# Patient Record
Sex: MALE | Race: WHITE | NOT HISPANIC OR LATINO | Employment: UNEMPLOYED | URBAN - METROPOLITAN AREA
[De-identification: names, ages, dates, MRNs, and addresses within clinical notes are randomized per-mention and may not be internally consistent; named-entity substitution may affect disease eponyms.]

---

## 2024-09-11 ENCOUNTER — HOSPITAL ENCOUNTER (INPATIENT)
Facility: CLINIC | Age: 59
LOS: 2 days | Discharge: HOME | DRG: 271 | End: 2024-09-13
Attending: EMERGENCY MEDICINE | Admitting: INTERNAL MEDICINE
Payer: COMMERCIAL

## 2024-09-11 ENCOUNTER — APPOINTMENT (OUTPATIENT)
Dept: ULTRASOUND IMAGING | Facility: CLINIC | Age: 59
End: 2024-09-11
Attending: EMERGENCY MEDICINE
Payer: COMMERCIAL

## 2024-09-11 ENCOUNTER — HOSPITAL ENCOUNTER (EMERGENCY)
Facility: CLINIC | Age: 59
Discharge: ANOTHER HOSPITAL | End: 2024-09-11
Attending: EMERGENCY MEDICINE | Admitting: EMERGENCY MEDICINE
Payer: COMMERCIAL

## 2024-09-11 VITALS
HEART RATE: 61 BPM | HEIGHT: 72 IN | RESPIRATION RATE: 18 BRPM | WEIGHT: 211.42 LBS | BODY MASS INDEX: 28.64 KG/M2 | DIASTOLIC BLOOD PRESSURE: 89 MMHG | SYSTOLIC BLOOD PRESSURE: 127 MMHG | OXYGEN SATURATION: 98 % | TEMPERATURE: 97.8 F

## 2024-09-11 DIAGNOSIS — I82.412 ACUTE DEEP VEIN THROMBOSIS (DVT) OF FEMORAL VEIN OF LEFT LOWER EXTREMITY (H): ICD-10-CM

## 2024-09-11 DIAGNOSIS — I80.202 PHLEGMASIA CERULEA DOLENS OF LEFT LOWER EXTREMITY (H): Primary | ICD-10-CM

## 2024-09-11 DIAGNOSIS — Z96.642 HISTORY OF LEFT HIP REPLACEMENT: ICD-10-CM

## 2024-09-11 DIAGNOSIS — I82.422 ACUTE DEEP VEIN THROMBOSIS (DVT) OF ILIAC VEIN OF LEFT LOWER EXTREMITY (H): ICD-10-CM

## 2024-09-11 LAB
ANION GAP SERPL CALCULATED.3IONS-SCNC: 10 MMOL/L (ref 7–15)
BASOPHILS # BLD AUTO: 0.1 10E3/UL (ref 0–0.2)
BASOPHILS NFR BLD AUTO: 1 %
BUN SERPL-MCNC: 16.1 MG/DL (ref 6–20)
CALCIUM SERPL-MCNC: 10.8 MG/DL (ref 8.8–10.4)
CHLORIDE SERPL-SCNC: 103 MMOL/L (ref 98–107)
CREAT SERPL-MCNC: 1.23 MG/DL (ref 0.67–1.17)
EGFRCR SERPLBLD CKD-EPI 2021: 69 ML/MIN/1.73M2
EOSINOPHIL # BLD AUTO: 0.2 10E3/UL (ref 0–0.7)
EOSINOPHIL NFR BLD AUTO: 3 %
ERYTHROCYTE [DISTWIDTH] IN BLOOD BY AUTOMATED COUNT: 13.2 % (ref 10–15)
GLUCOSE SERPL-MCNC: 90 MG/DL (ref 70–99)
HCO3 SERPL-SCNC: 25 MMOL/L (ref 22–29)
HCT VFR BLD AUTO: 44.5 % (ref 40–53)
HGB BLD-MCNC: 14.6 G/DL (ref 13.3–17.7)
HOLD SPECIMEN: NORMAL
HOLD SPECIMEN: NORMAL
IMM GRANULOCYTES # BLD: 0 10E3/UL
IMM GRANULOCYTES NFR BLD: 0 %
LYMPHOCYTES # BLD AUTO: 1.1 10E3/UL (ref 0.8–5.3)
LYMPHOCYTES NFR BLD AUTO: 15 %
MCH RBC QN AUTO: 32.4 PG (ref 26.5–33)
MCHC RBC AUTO-ENTMCNC: 32.8 G/DL (ref 31.5–36.5)
MCV RBC AUTO: 99 FL (ref 78–100)
MONOCYTES # BLD AUTO: 0.6 10E3/UL (ref 0–1.3)
MONOCYTES NFR BLD AUTO: 9 %
NEUTROPHILS # BLD AUTO: 5.3 10E3/UL (ref 1.6–8.3)
NEUTROPHILS NFR BLD AUTO: 73 %
NRBC # BLD AUTO: 0 10E3/UL
NRBC BLD AUTO-RTO: 0 /100
PLATELET # BLD AUTO: 173 10E3/UL (ref 150–450)
POTASSIUM SERPL-SCNC: 3.9 MMOL/L (ref 3.4–5.3)
RBC # BLD AUTO: 4.5 10E6/UL (ref 4.4–5.9)
SODIUM SERPL-SCNC: 138 MMOL/L (ref 135–145)
WBC # BLD AUTO: 7.3 10E3/UL (ref 4–11)

## 2024-09-11 PROCEDURE — 96376 TX/PRO/DX INJ SAME DRUG ADON: CPT

## 2024-09-11 PROCEDURE — 80048 BASIC METABOLIC PNL TOTAL CA: CPT | Performed by: EMERGENCY MEDICINE

## 2024-09-11 PROCEDURE — 96366 THER/PROPH/DIAG IV INF ADDON: CPT

## 2024-09-11 PROCEDURE — 99285 EMERGENCY DEPT VISIT HI MDM: CPT | Mod: 25

## 2024-09-11 PROCEDURE — 93005 ELECTROCARDIOGRAM TRACING: CPT

## 2024-09-11 PROCEDURE — 99223 1ST HOSP IP/OBS HIGH 75: CPT | Performed by: INTERNAL MEDICINE

## 2024-09-11 PROCEDURE — 96365 THER/PROPH/DIAG IV INF INIT: CPT

## 2024-09-11 PROCEDURE — 250N000011 HC RX IP 250 OP 636: Performed by: EMERGENCY MEDICINE

## 2024-09-11 PROCEDURE — 36415 COLL VENOUS BLD VENIPUNCTURE: CPT | Performed by: EMERGENCY MEDICINE

## 2024-09-11 PROCEDURE — 120N000001 HC R&B MED SURG/OB

## 2024-09-11 PROCEDURE — 93971 EXTREMITY STUDY: CPT | Mod: LT

## 2024-09-11 PROCEDURE — 85025 COMPLETE CBC W/AUTO DIFF WBC: CPT | Performed by: EMERGENCY MEDICINE

## 2024-09-11 RX ORDER — PROCHLORPERAZINE MALEATE 10 MG
10 TABLET ORAL EVERY 6 HOURS PRN
Status: DISCONTINUED | OUTPATIENT
Start: 2024-09-11 | End: 2024-09-13 | Stop reason: HOSPADM

## 2024-09-11 RX ORDER — NALOXONE HYDROCHLORIDE 0.4 MG/ML
0.4 INJECTION, SOLUTION INTRAMUSCULAR; INTRAVENOUS; SUBCUTANEOUS
Status: DISCONTINUED | OUTPATIENT
Start: 2024-09-11 | End: 2024-09-13 | Stop reason: HOSPADM

## 2024-09-11 RX ORDER — ONDANSETRON 4 MG/1
4 TABLET, ORALLY DISINTEGRATING ORAL EVERY 6 HOURS PRN
Status: DISCONTINUED | OUTPATIENT
Start: 2024-09-11 | End: 2024-09-13 | Stop reason: HOSPADM

## 2024-09-11 RX ORDER — HYDROMORPHONE HCL IN WATER/PF 6 MG/30 ML
0.2 PATIENT CONTROLLED ANALGESIA SYRINGE INTRAVENOUS
Status: DISCONTINUED | OUTPATIENT
Start: 2024-09-11 | End: 2024-09-13 | Stop reason: HOSPADM

## 2024-09-11 RX ORDER — POLYETHYLENE GLYCOL 3350 17 G/17G
17 POWDER, FOR SOLUTION ORAL 2 TIMES DAILY PRN
Status: DISCONTINUED | OUTPATIENT
Start: 2024-09-11 | End: 2024-09-13 | Stop reason: HOSPADM

## 2024-09-11 RX ORDER — BISACODYL 10 MG
10 SUPPOSITORY, RECTAL RECTAL DAILY PRN
Status: DISCONTINUED | OUTPATIENT
Start: 2024-09-11 | End: 2024-09-13 | Stop reason: HOSPADM

## 2024-09-11 RX ORDER — CALCIUM CARBONATE 500 MG/1
1000 TABLET, CHEWABLE ORAL 4 TIMES DAILY PRN
Status: DISCONTINUED | OUTPATIENT
Start: 2024-09-11 | End: 2024-09-13 | Stop reason: HOSPADM

## 2024-09-11 RX ORDER — AMOXICILLIN 250 MG
1 CAPSULE ORAL 2 TIMES DAILY PRN
Status: DISCONTINUED | OUTPATIENT
Start: 2024-09-11 | End: 2024-09-13 | Stop reason: HOSPADM

## 2024-09-11 RX ORDER — DEXTROSE, SODIUM CHLORIDE, SODIUM LACTATE, POTASSIUM CHLORIDE, AND CALCIUM CHLORIDE 5; .6; .31; .03; .02 G/100ML; G/100ML; G/100ML; G/100ML; G/100ML
INJECTION, SOLUTION INTRAVENOUS CONTINUOUS
Status: DISCONTINUED | OUTPATIENT
Start: 2024-09-12 | End: 2024-09-13

## 2024-09-11 RX ORDER — PROCHLORPERAZINE 25 MG
25 SUPPOSITORY, RECTAL RECTAL EVERY 12 HOURS PRN
Status: DISCONTINUED | OUTPATIENT
Start: 2024-09-11 | End: 2024-09-13 | Stop reason: HOSPADM

## 2024-09-11 RX ORDER — ASPIRIN 325 MG
325 TABLET, DELAYED RELEASE (ENTERIC COATED) ORAL DAILY
Status: ON HOLD | COMMUNITY
End: 2024-09-13

## 2024-09-11 RX ORDER — HYDRALAZINE HYDROCHLORIDE 10 MG/1
10 TABLET, FILM COATED ORAL EVERY 4 HOURS PRN
Status: DISCONTINUED | OUTPATIENT
Start: 2024-09-11 | End: 2024-09-13 | Stop reason: HOSPADM

## 2024-09-11 RX ORDER — HEPARIN SODIUM 10000 [USP'U]/100ML
0-5000 INJECTION, SOLUTION INTRAVENOUS CONTINUOUS
Status: DISPENSED | OUTPATIENT
Start: 2024-09-12 | End: 2024-09-13

## 2024-09-11 RX ORDER — ACETAMINOPHEN 650 MG/1
650 SUPPOSITORY RECTAL EVERY 4 HOURS PRN
Status: DISCONTINUED | OUTPATIENT
Start: 2024-09-11 | End: 2024-09-13 | Stop reason: HOSPADM

## 2024-09-11 RX ORDER — NALOXONE HYDROCHLORIDE 0.4 MG/ML
0.2 INJECTION, SOLUTION INTRAMUSCULAR; INTRAVENOUS; SUBCUTANEOUS
Status: DISCONTINUED | OUTPATIENT
Start: 2024-09-11 | End: 2024-09-13 | Stop reason: HOSPADM

## 2024-09-11 RX ORDER — SALIVA STIMULANT COMB. NO.3
2 SPRAY, NON-AEROSOL (ML) MUCOUS MEMBRANE
Status: DISCONTINUED | OUTPATIENT
Start: 2024-09-11 | End: 2024-09-13 | Stop reason: HOSPADM

## 2024-09-11 RX ORDER — HEPARIN SODIUM 10000 [USP'U]/100ML
0-5000 INJECTION, SOLUTION INTRAVENOUS CONTINUOUS
Status: DISCONTINUED | OUTPATIENT
Start: 2024-09-11 | End: 2024-09-11 | Stop reason: HOSPADM

## 2024-09-11 RX ORDER — OXYCODONE HYDROCHLORIDE 5 MG/1
5 TABLET ORAL EVERY 4 HOURS PRN
Status: DISCONTINUED | OUTPATIENT
Start: 2024-09-11 | End: 2024-09-13 | Stop reason: HOSPADM

## 2024-09-11 RX ORDER — HYDROMORPHONE HCL IN WATER/PF 6 MG/30 ML
0.4 PATIENT CONTROLLED ANALGESIA SYRINGE INTRAVENOUS
Status: DISCONTINUED | OUTPATIENT
Start: 2024-09-11 | End: 2024-09-13 | Stop reason: HOSPADM

## 2024-09-11 RX ORDER — ONDANSETRON 2 MG/ML
4 INJECTION INTRAMUSCULAR; INTRAVENOUS EVERY 6 HOURS PRN
Status: DISCONTINUED | OUTPATIENT
Start: 2024-09-11 | End: 2024-09-13 | Stop reason: HOSPADM

## 2024-09-11 RX ORDER — AMOXICILLIN 250 MG
2 CAPSULE ORAL 2 TIMES DAILY PRN
Status: DISCONTINUED | OUTPATIENT
Start: 2024-09-11 | End: 2024-09-13 | Stop reason: HOSPADM

## 2024-09-11 RX ORDER — HEPARIN SODIUM 10000 [USP'U]/100ML
0-5000 INJECTION, SOLUTION INTRAVENOUS CONTINUOUS
Status: DISCONTINUED | OUTPATIENT
Start: 2024-09-11 | End: 2024-09-11

## 2024-09-11 RX ORDER — HYDRALAZINE HYDROCHLORIDE 20 MG/ML
10 INJECTION INTRAMUSCULAR; INTRAVENOUS EVERY 4 HOURS PRN
Status: DISCONTINUED | OUTPATIENT
Start: 2024-09-11 | End: 2024-09-13 | Stop reason: HOSPADM

## 2024-09-11 RX ORDER — LIDOCAINE 40 MG/G
CREAM TOPICAL
Status: DISCONTINUED | OUTPATIENT
Start: 2024-09-11 | End: 2024-09-13 | Stop reason: HOSPADM

## 2024-09-11 RX ORDER — ACETAMINOPHEN 325 MG/1
650 TABLET ORAL EVERY 4 HOURS PRN
Status: DISCONTINUED | OUTPATIENT
Start: 2024-09-11 | End: 2024-09-13 | Stop reason: HOSPADM

## 2024-09-11 RX ADMIN — HEPARIN SODIUM 1700 UNITS/HR: 10000 INJECTION, SOLUTION INTRAVENOUS at 21:25

## 2024-09-11 RX ADMIN — HEPARIN SODIUM 1750 UNITS/HR: 10000 INJECTION, SOLUTION INTRAVENOUS at 18:06

## 2024-09-11 ASSESSMENT — ACTIVITIES OF DAILY LIVING (ADL)
ADLS_ACUITY_SCORE: 35
ADLS_ACUITY_SCORE: 35
DIFFICULTY_EATING/SWALLOWING: NO
TOILETING_ISSUES: NO
ADLS_ACUITY_SCORE: 35
DIFFICULTY_COMMUNICATING: NO
HEARING_DIFFICULTY_OR_DEAF: NO
ADLS_ACUITY_SCORE: 35
FALL_HISTORY_WITHIN_LAST_SIX_MONTHS: NO
WEAR_GLASSES_OR_BLIND: YES
ADLS_ACUITY_SCORE: 35
ADLS_ACUITY_SCORE: 33
VISION_MANAGEMENT: CONTACT LENSES
DOING_ERRANDS_INDEPENDENTLY_DIFFICULTY: NO
CONCENTRATING,_REMEMBERING_OR_MAKING_DECISIONS_DIFFICULTY: NO
WALKING_OR_CLIMBING_STAIRS_DIFFICULTY: NO
DRESSING/BATHING_DIFFICULTY: NO
ADLS_ACUITY_SCORE: 35
CHANGE_IN_FUNCTIONAL_STATUS_SINCE_ONSET_OF_CURRENT_ILLNESS/INJURY: NO

## 2024-09-11 ASSESSMENT — COLUMBIA-SUICIDE SEVERITY RATING SCALE - C-SSRS
1. IN THE PAST MONTH, HAVE YOU WISHED YOU WERE DEAD OR WISHED YOU COULD GO TO SLEEP AND NOT WAKE UP?: NO
2. HAVE YOU ACTUALLY HAD ANY THOUGHTS OF KILLING YOURSELF IN THE PAST MONTH?: NO
2. HAVE YOU ACTUALLY HAD ANY THOUGHTS OF KILLING YOURSELF IN THE PAST MONTH?: NO
1. IN THE PAST MONTH, HAVE YOU WISHED YOU WERE DEAD OR WISHED YOU COULD GO TO SLEEP AND NOT WAKE UP?: NO
6. HAVE YOU EVER DONE ANYTHING, STARTED TO DO ANYTHING, OR PREPARED TO DO ANYTHING TO END YOUR LIFE?: NO
6. HAVE YOU EVER DONE ANYTHING, STARTED TO DO ANYTHING, OR PREPARED TO DO ANYTHING TO END YOUR LIFE?: NO

## 2024-09-11 NOTE — ED TRIAGE NOTES
Pt had a total hip replacement on the left 3 months ago. He states he has been having swelling in that leg and had an ultrasound done today.  This US was positive for a DVT and he comes in for treatment.  Pt does have a hx of a stroke     Triage Assessment (Adult)       Row Name 09/11/24 1527          Triage Assessment    Airway WDL WDL        Respiratory WDL    Respiratory WDL WDL        Cardiac WDL    Cardiac WDL WDL

## 2024-09-11 NOTE — ED PROVIDER NOTES
Emergency Department Note      History of Present Illness     Chief Complaint   Leg Swelling      HPI   Mark T Goblisch is a 58 year old male 3 months post op total left hip arthroplasty with a history of CVA and PFO who presents to the ED for the evaluation of left leg swelling. The patient reports that he had an ultrasound done today by an orthopedist and was told he had DVT from the left knee up past the groin. He has experienced increased left calf swelling. His wife reports that his left calf is 3 inches bigger than his right. He has had discoloration around the ankle for a while. He was recently on a plane to [place]. He mentions he takes aspirin because he had a non-hemorrhagic stroke on 06/06/2019 and has history of PFO. He denies numbness or tingling in the left foot. Denies personal history of blood clots. Denies chest pain, cough, cold, rhinorrhea, nausea, vomiting, or diarrhea.    Independent Historian   Wife as detailed above.    Review of External Notes   None    Past Medical History     Medical History and Problem List   CVA  Gout  PFO    Medications   The patient denies current use of prescribed medication.     Surgical History   L total hip arthroplasty    Physical Exam     Patient Vitals for the past 24 hrs:   BP Temp Temp src Pulse Resp SpO2 Height Weight   09/11/24 1949 127/89 -- -- -- -- 98 % -- --   09/11/24 1939 -- -- -- -- -- 96 % -- --   09/11/24 1929 131/88 -- -- 61 -- 98 % -- --   09/11/24 1919 135/84 -- -- -- -- 97 % -- --   09/11/24 1909 -- -- -- -- -- 97 % -- --   09/11/24 1859 (!) 128/95 -- -- 63 -- 94 % -- --   09/11/24 1845 (!) 129/90 -- -- 67 18 97 % -- --   09/11/24 1830 132/89 -- -- 65 -- 96 % -- --   09/11/24 1821 -- -- -- -- -- 99 % -- --   09/11/24 1820 128/85 -- -- 69 -- -- -- --   09/11/24 1528 (!) 140/95 97.8  F (36.6  C) Temporal 66 17 100 % 1.829 m (6') 95.9 kg (211 lb 6.7 oz)     Physical Exam  Constitutional: Vital signs reviewed as above  General: Alert  HEENT: Moist  mucous membranes  Eyes: Conjunctiva normal.   Neck: Normal range of motion  Cardiovascular: Regular rate, Regular rhythm and normal heart sounds.  No MRG  Pulmonary/Chest: Effort normal and breath sounds normal. No respiratory distress. Patient has no wheezes. Patient has no rales.   Abdominal: Soft. Positive bowel sounds. No MRG.  Musculoskeletal/Extremities: Full ROM. Marked swelling from the mid left thigh all the way to the foot. There is purplish discoloration to the left distal calf down through the foot.  I was able to Doppler a left dorsalis pedis pulse.  The toes were warm and had good cap refill.  Endo: No pitting edema  Neurological: Alert, no focal deficits.  Skin: Skin is warm and dry.   Psychiatric: Pleasant    Diagnostics     Lab Results   Labs Ordered and Resulted from Time of ED Arrival to Time of ED Departure   BASIC METABOLIC PANEL - Abnormal       Result Value    Sodium 138      Potassium 3.9      Chloride 103      Carbon Dioxide (CO2) 25      Anion Gap 10      Urea Nitrogen 16.1      Creatinine 1.23 (*)     GFR Estimate 69      Calcium 10.8 (*)     Glucose 90     CBC WITH PLATELETS AND DIFFERENTIAL    WBC Count 7.3      RBC Count 4.50      Hemoglobin 14.6      Hematocrit 44.5      MCV 99      MCH 32.4      MCHC 32.8      RDW 13.2      Platelet Count 173      % Neutrophils 73      % Lymphocytes 15      % Monocytes 9      % Eosinophils 3      % Basophils 1      % Immature Granulocytes 0      NRBCs per 100 WBC 0      Absolute Neutrophils 5.3      Absolute Lymphocytes 1.1      Absolute Monocytes 0.6      Absolute Eosinophils 0.2      Absolute Basophils 0.1      Absolute Immature Granulocytes 0.0      Absolute NRBCs 0.0         Imaging   US Lower Extremity Venous Duplex Left   Final Result   IMPRESSION:   1.  Extensive left lower extremity deep vein thrombosis extending from the external iliac through the posterior tibial and peroneal veins.          EKG   ECG taken at 1722, ECG read at 1724  Normal  sinus rhythm  Normal ECG   Rate 63 bpm. WY interval 174 ms. QRS duration 84 ms. QT/QTc 386/395 ms. P-R-T axes 43 62 45.    Independent Interpretation   EKG from today shows sinus rhythm, rate of 63, no ischemic changes    ED Course      Medications Administered   Medications   heparin ANTICOAGULANT Loading dose for HIGH INTENSITY TREATMENT * Give BEFORE starting heparin infusion (7,650 Units Intravenous $Given 9/11/24 1805)       Procedures   Procedures     Discussion of Management   IR, Dr. Randle  Emergency medicine, Dr. Jordan    ED Course   ED Course as of 09/11/24 2051   Wed Sep 11, 2024   1542 I evaluated the patient and obtained history.   1733 I rechecked and updated the patient.    1746 I spoke with Dr. Randle from IR at [facility].   1757 I spoke with Dr. Jordan from [facility] ED.       Additional Documentation  None    Medical Decision Making / Diagnosis     CMS Diagnoses: None    MIPS       None    [facility]   Mark T Goblisch is a 58 year old male who presents to the emergency department after being seen at an orthopedic clinic and having an ultrasound performed which showed DVT of the left lower extremity.  They did send him with a disc but no report.  Unfortunate unable to see this in the system.  Given the discoloration of the left lower extremity and the fact that is only able to Doppler a pulse I did repeat the ultrasound.  This does show extensive DVT as detailed above.  I discussed the case with Dr. Randle, the Chief of interventional radiology, who agreed this patient was an IR candidate.  Heparin was started here.  He did not require thing for pain.  He is comfortable the plan as with his wife.  Basic labs are unremarkable.  Again there is no shortness of breath or chest pain.  EKG showed no signs of ischemia or heart strain.  He was transported over to [facility] in stable condition.  I did discuss the case with Dr. Jordan at [facility] ER as well and they will be expecting his  arrival.    Disposition   The patient was transferred to [facility].    Diagnosis     ICD-10-CM    1. Acute deep vein thrombosis (DVT) of femoral vein of left lower extremity (H)  I82.412              Scribe Disclosure:  IJoanne, am serving as a scribe at 4:07 PM on 9/11/2024 to document services personally performed by Benjie Szymanski MD based on my observations and the provider's statements to me.        Benjie Szymanski MD  09/11/24 2051

## 2024-09-12 ENCOUNTER — APPOINTMENT (OUTPATIENT)
Dept: INTERVENTIONAL RADIOLOGY/VASCULAR | Facility: CLINIC | Age: 59
DRG: 271 | End: 2024-09-12
Attending: INTERNAL MEDICINE
Payer: COMMERCIAL

## 2024-09-12 LAB
ATRIAL RATE - MUSE: 63 BPM
DIASTOLIC BLOOD PRESSURE - MUSE: NORMAL MMHG
INTERPRETATION ECG - MUSE: NORMAL
P AXIS - MUSE: 43 DEGREES
PR INTERVAL - MUSE: 174 MS
QRS DURATION - MUSE: 84 MS
QT - MUSE: 386 MS
QTC - MUSE: 395 MS
R AXIS - MUSE: 62 DEGREES
SYSTOLIC BLOOD PRESSURE - MUSE: NORMAL MMHG
T AXIS - MUSE: 45 DEGREES
UFH PPP CHRO-ACNC: 0.57 IU/ML
UFH PPP CHRO-ACNC: 0.79 IU/ML
UFH PPP CHRO-ACNC: 0.83 IU/ML
VENTRICULAR RATE- MUSE: 63 BPM

## 2024-09-12 PROCEDURE — 250N000011 HC RX IP 250 OP 636: Performed by: NURSE PRACTITIONER

## 2024-09-12 PROCEDURE — 99152 MOD SED SAME PHYS/QHP 5/>YRS: CPT

## 2024-09-12 PROCEDURE — 06CN3ZZ EXTIRPATION OF MATTER FROM LEFT FEMORAL VEIN, PERCUTANEOUS APPROACH: ICD-10-PCS | Performed by: RADIOLOGY

## 2024-09-12 PROCEDURE — 36415 COLL VENOUS BLD VENIPUNCTURE: CPT | Performed by: INTERNAL MEDICINE

## 2024-09-12 PROCEDURE — 250N000009 HC RX 250: Performed by: NURSE PRACTITIONER

## 2024-09-12 PROCEDURE — 250N000011 HC RX IP 250 OP 636: Performed by: RADIOLOGY

## 2024-09-12 PROCEDURE — C1769 GUIDE WIRE: HCPCS

## 2024-09-12 PROCEDURE — 272N000116 HC CATH CR1

## 2024-09-12 PROCEDURE — 272N000196 HC ACCESSORY CR5

## 2024-09-12 PROCEDURE — 99233 SBSQ HOSP IP/OBS HIGH 50: CPT | Performed by: INTERNAL MEDICINE

## 2024-09-12 PROCEDURE — 75825 VEIN X-RAY TRUNK: CPT

## 2024-09-12 PROCEDURE — 255N000002 HC RX 255 OP 636: Performed by: RADIOLOGY

## 2024-09-12 PROCEDURE — 85520 HEPARIN ASSAY: CPT | Performed by: INTERNAL MEDICINE

## 2024-09-12 PROCEDURE — 120N000001 HC R&B MED SURG/OB

## 2024-09-12 PROCEDURE — 250N000011 HC RX IP 250 OP 636: Performed by: INTERNAL MEDICINE

## 2024-09-12 PROCEDURE — 272N000566 HC SHEATH CR3

## 2024-09-12 RX ORDER — FENTANYL CITRATE 50 UG/ML
25-50 INJECTION, SOLUTION INTRAMUSCULAR; INTRAVENOUS EVERY 5 MIN PRN
Status: DISCONTINUED | OUTPATIENT
Start: 2024-09-12 | End: 2024-09-12 | Stop reason: HOSPADM

## 2024-09-12 RX ORDER — NALOXONE HYDROCHLORIDE 0.4 MG/ML
0.2 INJECTION, SOLUTION INTRAMUSCULAR; INTRAVENOUS; SUBCUTANEOUS
Status: DISCONTINUED | OUTPATIENT
Start: 2024-09-12 | End: 2024-09-12 | Stop reason: HOSPADM

## 2024-09-12 RX ORDER — IOPAMIDOL 612 MG/ML
100 INJECTION, SOLUTION INTRAVASCULAR ONCE
Status: COMPLETED | OUTPATIENT
Start: 2024-09-12 | End: 2024-09-12

## 2024-09-12 RX ORDER — HEPARIN SODIUM 1000 [USP'U]/ML
1000-10000 INJECTION, SOLUTION INTRAVENOUS; SUBCUTANEOUS ONCE
Status: COMPLETED | OUTPATIENT
Start: 2024-09-12 | End: 2024-09-12

## 2024-09-12 RX ORDER — FLUMAZENIL 0.1 MG/ML
0.2 INJECTION, SOLUTION INTRAVENOUS
Status: DISCONTINUED | OUTPATIENT
Start: 2024-09-12 | End: 2024-09-12 | Stop reason: HOSPADM

## 2024-09-12 RX ORDER — HEPARIN SODIUM 200 [USP'U]/100ML
1 INJECTION, SOLUTION INTRAVENOUS EVERY 5 MIN PRN
Status: DISCONTINUED | OUTPATIENT
Start: 2024-09-12 | End: 2024-09-12 | Stop reason: HOSPADM

## 2024-09-12 RX ORDER — NALOXONE HYDROCHLORIDE 0.4 MG/ML
0.4 INJECTION, SOLUTION INTRAMUSCULAR; INTRAVENOUS; SUBCUTANEOUS
Status: DISCONTINUED | OUTPATIENT
Start: 2024-09-12 | End: 2024-09-12 | Stop reason: HOSPADM

## 2024-09-12 RX ADMIN — HEPARIN SODIUM 1500 UNITS/HR: 10000 INJECTION, SOLUTION INTRAVENOUS at 22:46

## 2024-09-12 RX ADMIN — IOPAMIDOL 28 ML: 612 INJECTION, SOLUTION INTRAVENOUS at 18:38

## 2024-09-12 RX ADMIN — HEPARIN SODIUM 1700 UNITS/HR: 10000 INJECTION, SOLUTION INTRAVENOUS at 01:08

## 2024-09-12 RX ADMIN — LIDOCAINE HYDROCHLORIDE 10 ML: 10 INJECTION, SOLUTION INFILTRATION; PERINEURAL at 18:43

## 2024-09-12 RX ADMIN — SODIUM CHLORIDE, SODIUM LACTATE, POTASSIUM CHLORIDE, CALCIUM CHLORIDE AND DEXTROSE MONOHYDRATE: 5; 600; 310; 30; 20 INJECTION, SOLUTION INTRAVENOUS at 17:18

## 2024-09-12 RX ADMIN — MIDAZOLAM 1 MG: 1 INJECTION INTRAMUSCULAR; INTRAVENOUS at 18:11

## 2024-09-12 RX ADMIN — MIDAZOLAM 1 MG: 1 INJECTION INTRAMUSCULAR; INTRAVENOUS at 18:16

## 2024-09-12 RX ADMIN — FENTANYL CITRATE 25 MCG: 50 INJECTION, SOLUTION INTRAMUSCULAR; INTRAVENOUS at 18:37

## 2024-09-12 RX ADMIN — MIDAZOLAM 1 MG: 1 INJECTION INTRAMUSCULAR; INTRAVENOUS at 18:28

## 2024-09-12 RX ADMIN — FENTANYL CITRATE 50 MCG: 50 INJECTION, SOLUTION INTRAMUSCULAR; INTRAVENOUS at 18:15

## 2024-09-12 RX ADMIN — HEPARIN SODIUM 1600 UNITS/HR: 10000 INJECTION, SOLUTION INTRAVENOUS at 17:02

## 2024-09-12 RX ADMIN — HEPARIN SODIUM 1700 UNITS/HR: 10000 INJECTION, SOLUTION INTRAVENOUS at 01:02

## 2024-09-12 RX ADMIN — FENTANYL CITRATE 25 MCG: 50 INJECTION, SOLUTION INTRAMUSCULAR; INTRAVENOUS at 18:32

## 2024-09-12 RX ADMIN — SODIUM CHLORIDE, SODIUM LACTATE, POTASSIUM CHLORIDE, CALCIUM CHLORIDE AND DEXTROSE MONOHYDRATE: 5; 600; 310; 30; 20 INJECTION, SOLUTION INTRAVENOUS at 01:15

## 2024-09-12 RX ADMIN — FENTANYL CITRATE 50 MCG: 50 INJECTION, SOLUTION INTRAMUSCULAR; INTRAVENOUS at 18:10

## 2024-09-12 RX ADMIN — SODIUM CHLORIDE, SODIUM LACTATE, POTASSIUM CHLORIDE, CALCIUM CHLORIDE AND DEXTROSE MONOHYDRATE: 5; 600; 310; 30; 20 INJECTION, SOLUTION INTRAVENOUS at 23:15

## 2024-09-12 RX ADMIN — FENTANYL CITRATE 50 MCG: 50 INJECTION, SOLUTION INTRAMUSCULAR; INTRAVENOUS at 18:20

## 2024-09-12 RX ADMIN — SODIUM CHLORIDE, SODIUM LACTATE, POTASSIUM CHLORIDE, CALCIUM CHLORIDE AND DEXTROSE MONOHYDRATE: 5; 600; 310; 30; 20 INJECTION, SOLUTION INTRAVENOUS at 08:53

## 2024-09-12 RX ADMIN — HEPARIN SODIUM 3000 UNITS: 1000 INJECTION INTRAVENOUS; SUBCUTANEOUS at 18:20

## 2024-09-12 RX ADMIN — MIDAZOLAM 1 MG: 1 INJECTION INTRAMUSCULAR; INTRAVENOUS at 17:55

## 2024-09-12 ASSESSMENT — ACTIVITIES OF DAILY LIVING (ADL)
ADLS_ACUITY_SCORE: 20

## 2024-09-12 NOTE — H&P
[facility]    History and Physical - Hospitalist Service       Date of Admission:  9/11/2024    Assessment & Plan      Mark T Goblisch is a 58 year old male admitted on 9/11/2024. He presents initially from 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      Extensive left lower extremity DVT, provoked, with phlegmasia cerulea dolens: Following left hip arthroplasty late May.  Had swelling at his 6-week follow-up which likely was early DVT at that time.  Significant increase in swelling over the past 1 week.  History of hypercoagulable workup in 2019 following CVA was negative  -Heparin infusion, high intensity  -Interventional radiology consulted anticipating thrombectomy with potential lytics  -Elevate left lower extremity as able  -Thigh-high compression stocking to be initiated 9/12 AM, 12 hours after anticoagulation initiation to minimize risk of post thrombotic syndrome  -Acetaminophen, oxycodone, IV Dilaudid available if needed for pain control  -Pharmacy liaison consultation for DOAC coverage    History of left-sided embolic CVA 2019: Initially presented with right-sided paresis and expressive aphasia.  Recovered with no residual deficits  -325 mg aspirin can be held while on anticoagulation.  Should resume when anticoagulation complete          Diet:  NPO at midnight as there is no plan for acute intervention tonight per IR.  DVT Prophylaxis: Heparin infusion  Brandon Catheter: Not present  Lines: None     Cardiac Monitoring: None  Code Status:  Full code.  Confirmed with patient on admission    Clinically Significant Risk Factors Present on Admission           # Hypercalcemia: Highest Ca = 10.8 mg/dL in last 2 days, will monitor as appropriate      # Drug Induced Platelet Defect: home medication list includes an antiplatelet medication            # Overweight: Estimated body mass index is 28.55 kg/m  as calculated from the following:    Height as of this encounter: 1.829 m (6').    Weight as of this encounter: 95.5 kg (210 lb 8.6 oz).                    Disposition Plan     Medically Ready for Discharge: Anticipated in 2-4 Days           Karan Cheatham MD  Hospitalist Service  [facility]  Securely message with [employer] (more info)  Text page via [employer] Paging/Directory     ______________________________________________________________________    Chief Complaint   Left lower extremity swelling    History is obtained from the patient, chart review, discussion with Dr. Lockwood in the emergency department    History of Present Illness   Mark T Goblisch is a 58 year old male who presented to [facility] emergency department as an ER to ER transfer from [facility] after he was found to have an extensive left lower extremity DVT.  Interventional radiology team was contacted at [facility], and initial thought that he would go directly to IR for thrombectomy.  When he arrived at [facility], IR was again involved and recommended thrombectomy in AM.  Hospitalist service was contacted for admission.    Irving had a left total hip arthroplasty shortly after [facility] through [facility] orthopedics.  Had his surgery in the afternoon and had an overnight monitoring stay with [facility] in a care unit.  I am unable to see records from this.  He was recommended for baby aspirin as postoperative DVT prophylaxis.  As he was already on a full dose aspirin in the setting of prior left-sided stroke, he was told that he did not need to take additional aspirin.    He did have some postoperative swelling, but tells me that he was up and no longer requiring pain medications within a week or 2 of his surgery, recalls playing golf without difficulty.    Patient recalls some swelling from his left knee down through his dorsal foot around his 6-week  orthopedic follow-up.  He thought he might have a gout flare as well.  He recalls his second and third toes being warm and red and tender as well as ankle redness and tenderness and knee redness and tenderness associated with the swelling.  Has a history of gout and thought this could potentially be a gout flare, but has never had gout flare in more than 1 joint at a time.    He was seen by his orthopedic team who also thought that this could potentially be gout.  Was initiated on treatment for gout and was offered a lower extremity ultrasound to rule out DVT if he desired.  Patient tried to schedule this and scheduling was more than a week out, so he actually never completed this ultrasound.  Swelling improved over the coming week or so, but never resolved.  Did not have similar pain in the ankle and foot, so did not pursue ultrasound of lower extremity further.    Continued to have some stable swelling distal to his knee over the past weeks.  1 week ago on Wednesday, however, he noticed increased swelling into his thigh.  On Friday, however, flew to [place] for a planned trip.  He made a plan to follow-up with his orthopedic team when he returned from [place] this Monday.    Was seen in [facility] 9/11/2024 where an ultrasound was performed and he was found to have an extensive DVT.  Was sent to [facility] emergency department.  There, as noted above, interventional radiology was involved and he was subsequently transferred to [facility] for thrombectomy.    Patient has no shortness of breath, no chest discomfort.  He has no prior history of DVT, though his CVA in 2019 was attributed to embolic event with PFO.  He had a hypercoagulable workup at that time which was negative.  He has since had a PFO closure device placed.    He does not have significant pain in his left lower extremity.  Some discomfort associated with stretching of skin from edema.   he tells me the swelling is essentially stable since Friday nearly  1 week ago.  He does have phlegmasia cerulea dolens primarily of his lower extremity, but into the thigh as well.  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  Left total hip arthroplasty May 2024.    Prior to Admission Medications   Prior to Admission Medications   Prescriptions Last Dose Informant Patient Reported? Taking?   NONFORMULARY 9/11/2024 at am  Yes Yes   Sig: Tart cherry extract 1200 mg capsule- 1 capsule daily   aspirin (ASA) 325 MG EC tablet 9/11/2024 at am  Yes Yes   Sig: Take 325 mg by mouth daily.      Facility-Administered Medications: None           Physical Exam   Vital Signs: Temp: 97.6  F (36.4  C) Temp src: Temporal BP: (!) 121/90 Pulse: 62   Resp: 18 SpO2: 97 % O2 Device: None (Room air)    Weight: 210 lbs 8.63 oz    General Appearance: fit and generally well appearing 58 year old male.  Nontoxic, in no distress  Eyes: No scleral icterus or injection  HEENT: Normocephalic and atraumatic  Respiratory: Breath sounds are clear bilateral to auscultation without wheezes or crackles.  Cardiovascular: Regular rate and rhythm without murmur  Lymph/Hematologic: Tense edema of left lower extremity and thigh.  Asymmetric as compared to right  Skin: No jaundice. 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  Neurologic: Alert, conversant, appropriate in conversation.  Mental status grossly intact.  Psychiatric: Normal affect, very pleasant    Medical Decision Making       65 MINUTES SPENT BY ME on the date of service doing chart review, history, exam, documentation & further activities per the note.      Data     I have  personally reviewed the following data over the past 24 hrs:    7.3  \   14.6   / 173     138 103 16.1 /  90   3.9 25 1.23 (H) \       Imaging results reviewed over the past 24 hrs:   Recent Results (from the past 24 hour(s))   US Lower Extremity Venous Duplex Left    Narrative    EXAM: US LOWER EXTREMITY VENOUS DUPLEX LEFT  LOCATION: [facility]  DATE: 9/11/2024    INDICATION: Known DVT, distal leg now discolored, concern for large clot burden in the leg  COMPARISON: None.  TECHNIQUE: Venous Duplex ultrasound of the left lower extremity with and without compression, augmentation and duplex. Color flow and spectral Doppler with waveform analysis performed.    FINDINGS: Exam includes the common femoral, femoral, popliteal, and contralateral common femoral veins as well as segmentally visualized deep calf veins and greater saphenous vein.     LEFT: Intraluminal thrombus and noncompressible vessels extending from the left external iliac vein through the posterior tibial vein. Partial compression of the left peroneal vein. The visualized portions of the IVC are patent. There is extension of the   thrombus into the saphenofemoral junction. No popliteal cyst.      Impression    IMPRESSION:  1.  Extensive left lower extremity deep vein thrombosis extending from the external iliac through the posterior tibial and peroneal veins.

## 2024-09-12 NOTE — IR NOTE
Interventional Radiology Intra-procedural Nursing Note    Patient Name: Mark T Goblisch  Medical Record Number: [id number]  Today's Date: September 12, 2024    Procedure: Left Leg Venogram with Mechanical Thrombectomy under Moderate Sedation.  Start time: 1809  End time: 1840  Report provided to: Elizabeth HYDE    Note: Patient entered Interventional Radiology Suite number 1 via cart. Patient awake, alert and orientated. Assisted onto procedural table in prone position. Prepped and draped.  Dr. Randle in room. Time out and procedure started. Vital signs stable. Telemetry reading NSR.    Procedure well tolerated by patient without complications. Procedure end with debrief by Dr. Randle.    Pt requires bed rest until popliteal suture is removed.  May sit HOB up to 45 degree if tolerates.    Administered medication totals:  Lidocaine 1% 10 mL Intradermal  Heparin 3000 Units IVP  Versed 4 mg IVP  Fentanyl 200 mcg IVP    Last dose of sedation administered at 1837.

## 2024-09-12 NOTE — CONSULTS
Patient has Medical Assistance through [employer].    Xarelto/Eliquis:  $0/mo.     Josie Bautista  Pharmacy Technician/Liaison, Discharge Pharmacy   [phone] (voice or text)  [email or web address]

## 2024-09-12 NOTE — ED NOTES
Bed: ED22  Expected date: 9/11/24  Expected time: 8:15 PM  Means of arrival: Ambulance  Comments:  [facility] 58M DVT; IR Pt

## 2024-09-12 NOTE — PLAN OF CARE
Mental Status: A&O x4  Activity/dangle: Independent in room  Diet: NPO  Pain: Denies pain  Brandon/Voiding: Voiding in the bathroom  Tele/Restraints/Iso: N/A  02/LDA: Room air. Heparin infusing at 1600 units/hr recheck Heparin Xa at 1946. Dextrose 5% in LR infusing at 125 mL/hr.  D/C Date:TBD  Other Info: Lower left extremity venogram scheduled today.

## 2024-09-12 NOTE — ED NOTES
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 Transferred to [facility] for IR services. IR currently not ready for patient. Arrives receiving an infusion of Heparin. Denies pain.   Focused Assessment:  A&Ox4, LLE with dusky color, decreased sensation and weak pedal pulse felt with doppler. No SOB, No chest pain. VSS on RA.  *see lab results from [facility]   Treatments and/or interventions provided: Heparin gtt continued  Patient's response to treatments and/or interventions: cont to assess    To be done/followed up on inpatient unit:  Hep Xa at midnight    Does this patient have any cognitive concerns?:  none    Activity level - Baseline/Home:  Independent  Activity Level - Current:   Independent    Patient's Preferred language: English   Needed?: No    Isolation: None  Infection: Not Applicable  Patient tested for COVID 19 prior to admission: NO  Bariatric?: No    Vital Signs:   Vitals:    09/11/24 2024 09/11/24 2025 09/11/24 2026 09/11/24 2029   BP: (!) 121/90      Pulse: 62      Resp:    18   Temp:    97.6  F (36.4  C)   TempSrc:    Temporal   SpO2:  92% 97%    Weight:       Height:           Cardiac Rhythm:     Was the PSS-3 completed:   Yes  What interventions are required if any?               Family Comments: at bedside  OBS brochure/video discussed/provided to patient/family: Yes       For the majority of the shift this patient's behavior was Green.   Behavioral interventions performed were none needed.    ED NURSE PHONE NUMBER: [phone]

## 2024-09-12 NOTE — PROGRESS NOTES
Lab contacted regarding hep anti xa draw,stated that it did not reflect on the list for lab draw,will now send someone to do the lab draw.

## 2024-09-12 NOTE — ED TRIAGE NOTES
[facility]  ED Arrival Note      Means of Arrival: EMS  Comes from: [facility] ED      Story: Patient diagnosed with a large DVT in the L leg. Transferred to [facility] for IR services. IR currently not ready for patient. Arrives receiving an infusion of Heparin. Denies pain.         EMS/PD Interventions: N/A  EMS Medications: N/A    Meets Stroke Criteria? No  Meets Trauma Criteria? No  Shock Index (HR/SBP): <0.8      Directed to: Main ED  Belongings: Remain with patient           Triage Assessment (Adult)       Row Name 09/11/24 2022          Triage Assessment    Airway WDL WDL        Respiratory WDL    Respiratory WDL WDL        Skin Circulation/Temperature WDL    Skin Circulation/Temperature WDL X        Cognitive/Neuro/Behavioral WDL    Cognitive/Neuro/Behavioral WDL WDL

## 2024-09-12 NOTE — PHARMACY-ADMISSION MEDICATION HISTORY
Pharmacist Admission Medication History    Admission medication history is complete. The information provided in this note is only as accurate as the sources available at the time of the update.    Information Source(s): Patient and CareEverywhere/SureScripts via in-person    Pertinent Information: None    Changes made to PTA medication list:  Added: all  Deleted: None  Changed: None    Allergies reviewed with patient and updates made in EHR: yes    Medication History Completed By: Lisa Huff RPH 9/11/2024 9:13 PM    PTA Med List   Medication Sig Last Dose    aspirin (ASA) 325 MG EC tablet Take 325 mg by mouth daily. 9/11/2024 at am    NONFORMULARY Tart cherry extract 1200 mg capsule- 1 capsule daily 9/11/2024 at am

## 2024-09-12 NOTE — CONSULTS
Interventional Radiology - Pre-Procedure Note:  Inpatient - [facility]  09/12/2024     Procedure Requested: LLE DVT thrombectomy  Requested by: Dr Cheatham    Brief HPI: Mark T Goblisch is a 58 year old male admitted on 9/11/2024. 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     Irving was seen in his room today. He is sitting up in a chair and overall is comfortable. His left leg has improved somewhat in swelling and color since starting on IV Heparin. He has a h/o left hip replacement in late May without complications. He was golfing after 2 weeks from surgery. He had some minor swelling of his left ankle and calf since surgery which was considered normal. Last week he noticed swelling of the left leg that extended above his knee. He didn't feel too worried about it and flew to [place] over the weekend. The swelling became worse and coloration of his leg was purple. He saw his MD who referred him to the ED. Imaging was done which demonstrated the extensive DVT.     Reviewed procedure in detail with the patent who is eager to get the clot removed.     ASSESSMENT/PLAN: Imaging and chart reviewed with IR Dr Ross who feels patient is appropriate for a LLE thrombectomy.     -Continue NPO  -May have ice chips and sips of water with medications  -To IR on a cart when called for     IMAGING:    EXAM: US LOWER EXTREMITY VENOUS DUPLEX LEFT  LOCATION: [facility]  DATE: 9/11/2024     INDICATION: Known DVT, distal leg now discolored, concern for large clot burden in the leg  COMPARISON: None.  TECHNIQUE: Venous Duplex ultrasound of the left lower extremity with and without compression, augmentation and duplex. Color flow and spectral Doppler with waveform analysis performed.     FINDINGS: Exam includes the common femoral,  femoral, popliteal, and contralateral common femoral veins as well as segmentally visualized deep calf veins and greater saphenous vein.      LEFT: Intraluminal thrombus and noncompressible vessels extending from the left external iliac vein through the posterior tibial vein. Partial compression of the left peroneal vein. The visualized portions of the IVC are patent. There is extension of the   thrombus into the saphenofemoral junction. No popliteal cyst.                                                                  IMPRESSION:  1.  Extensive left lower extremity deep vein thrombosis extending from the external iliac through the posterior tibial and peroneal veins.    NPO: Yes  ANTICOAGULANTS: Yes, IV Heparin    No past medical history on file.    Medications Prior to Admission   Medication Sig Dispense Refill Last Dose    aspirin (ASA) 325 MG EC tablet Take 325 mg by mouth daily.   9/11/2024 at am    NONFORMULARY Tart cherry extract 1200 mg capsule- 1 capsule daily   9/11/2024 at am     ALLERGIES  No Known Allergies    LABS:  ROUTINE ICU LABS (Last four results)  CMP  Recent Labs   Lab 09/11/24  1716      POTASSIUM 3.9   CHLORIDE 103   CO2 25   ANIONGAP 10   GLC 90   BUN 16.1   CR 1.23*   GFRESTIMATED 69   LINDSEY 10.8*     CBC  Recent Labs   Lab 09/11/24  1716   WBC 7.3   RBC 4.50   HGB 14.6   HCT 44.5   MCV 99   MCH 32.4   MCHC 32.8   RDW 13.2        EXAM:  Temp:  [97.6  F (36.4  C)-98.3  F (36.8  C)] 98.1  F (36.7  C)  Pulse:  [61-78] 61  Resp:  [17-18] 18  BP: (113-140)/(76-95) 113/76  SpO2:  [92 %-100 %] 95 %    General:Pleasant, engaged, well nourished male, sitting up in a chair in no acute distress.    Neuro:  A&O x 4. Moves all extremities equally.  Resp:  Lungs clear to auscultation bilaterally.  Cardio:  S1S2 and reg, without murmur, clicks or rubs  Vascular: +2/4 bilateral dorsalis pedis pulses   -L leg > right leg, with firmness throughout the left leg and edema in the foot.   -Color and  warmth = in both legs    Skin:  Without excoriations, ecchymosis, erythema, lesions or open sores.  MSK:  No gross motor weakness.  Sensation intact.  Proprioception intact.    Pre-Sedation Code Status Assessment:  Code Status: Full Code intra procedure, per chart review.     History and Physical Reviewed: H&P documented within 30 days.  I have personally reviewed the patient's medical history and have updated the medical record as necessary.    Procedural education reviewed with patient Irving in detail including, but not limited to risks, benefits and alternatives with understanding verbalized by him.    Total time spent on the date of the encounter: 40 minutes.    [facility] Interventional Radiology CNP ([phone]) (phone [phone])

## 2024-09-12 NOTE — PROGRESS NOTES
[facility]  Hospitalist Progress Note  Jose De Jesus Foote MD  09/12/2024    Assessment & Plan   Mark T Goblisch is a 58 year old male admitted on 9/11/2024. 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        Extensive left lower extremity DVT, provoked, with phlegmasia cerulea dolens: Following left hip arthroplasty late May.  Had swelling at his 6-week follow-up which likely was early DVT at that time.  Significant increase in swelling over the past 1 week.  History of hypercoagulable workup in 2019 following CVA was negative  -Heparin infusion, high intensity  -Interventional radiology consulted anticipating thrombectomy    -Elevate left lower extremity as able  -Thigh-high compression stocking to be initiated 9/12 AM, 12 hours after anticoagulation initiation to minimize risk of post thrombotic syndrome  -Acetaminophen, oxycodone, IV Dilaudid available if needed for pain control  -Pharmacy liaison consultation for DOAC coverage     History of left-sided embolic CVA 2019: Initially presented with right-sided paresis and expressive aphasia.  Recovered with no residual deficits  -325 mg aspirin can be held while on anticoagulation.  Should resume when anticoagulation complete    Diet:  NPO at midnight as there is no plan for acute intervention tonight per IR.  DVT Prophylaxis: Heparin infusion  Brandon Catheter: Not present  Lines: None     Cardiac Monitoring: None  Code Status:  Full code.  Confirmed with patient on admission        Clinically Significant Risk Factors Present on Admission           # Hypercalcemia: Highest Ca = 10.8 mg/dL in last 2 days, will monitor as appropriate      # Drug Induced Platelet Defect: home medication list includes an antiplatelet medication            # Overweight: Estimated body mass  index is 28.55 kg/m  as calculated from the following:    Height as of this encounter: 1.829 m (6').    Weight as of this encounter: 95.5 kg (210 lb 8.6 oz).           Disposition Plan     Medically Ready for Discharge: Anticipated on 9/13    Disposition:   -- to home    Interval History   H and P reviewed  IR consult noted    -Data reviewed today: I reviewed all new labs and imaging over the last 24 hours. I personally reviewed no images or EKG's today.    Physical Exam    , Blood pressure 113/76, pulse 61, temperature 98.1  F (36.7  C), temperature source Oral, resp. rate 18, height 1.829 m (6'), weight 94.8 kg (209 lb), SpO2 95%.  Vitals:    09/11/24 2023 09/12/24 0536   Weight: 95.5 kg (210 lb 8.6 oz) 94.8 kg (209 lb)     Vital Signs with Ranges  Temp:  [97.6  F (36.4  C)-98.3  F (36.8  C)] 98.1  F (36.7  C)  Pulse:  [61-78] 61  Resp:  [17-18] 18  BP: (113-140)/(76-95) 113/76  SpO2:  [92 %-100 %] 95 %  I/O's Last 24 hours  No intake/output data recorded.    Constitutional: Awake, alert, cooperative, no apparent distress  Respiratory: Clear to auscultation bilaterally, no crackles or wheezing  Cardiovascular: Regular rate and rhythm, normal S1 and S2, and no murmur noted  GI: Normal bowel sounds, soft, non-distended, non-tender  Skin/Integumen: No rashes, no cyanosis, LLE edema  Other:      Medications   All medications were reviewed.  Current Facility-Administered Medications   Medication Dose Route Frequency Provider Last Rate Last Admin    dextrose 5% in lactated ringers infusion   Intravenous Continuous Cheatham, Karan Prater  mL/hr at 09/12/24 0853 New Bag at 09/12/24 0853    heparin 25,000 units in 0.45% NaCl 250 mL ANTICOAGULANT infusion  0-5,000 Units/hr Intravenous Continuous Cheatham, Karan Prater MD 16 mL/hr at 09/12/24 0625 1,600 Units/hr at 09/12/24 0625    Patient is already receiving anticoagulation with heparin, enoxaparin (LOVENOX), warfarin (COUMADIN)  or other anticoagulant medication   Does not  apply Continuous PRN Karan Cheatham MD         Current Facility-Administered Medications   Medication Dose Route Frequency Provider Last Rate Last Admin    sodium chloride (PF) 0.9% PF flush 3 mL  3 mL Intracatheter Q8H Karan Cheatham MD            Data   Recent Labs   Lab 09/11/24  1716   WBC 7.3   HGB 14.6   MCV 99         POTASSIUM 3.9   CHLORIDE 103   CO2 25   BUN 16.1   CR 1.23*   ANIONGAP 10   LINDSEY 10.8*   GLC 90       Recent Results (from the past 24 hour(s))   US Lower Extremity Venous Duplex Left    Narrative    EXAM: US LOWER EXTREMITY VENOUS DUPLEX LEFT  LOCATION: [facility]  DATE: 9/11/2024    INDICATION: Known DVT, distal leg now discolored, concern for large clot burden in the leg  COMPARISON: None.  TECHNIQUE: Venous Duplex ultrasound of the left lower extremity with and without compression, augmentation and duplex. Color flow and spectral Doppler with waveform analysis performed.    FINDINGS: Exam includes the common femoral, femoral, popliteal, and contralateral common femoral veins as well as segmentally visualized deep calf veins and greater saphenous vein.     LEFT: Intraluminal thrombus and noncompressible vessels extending from the left external iliac vein through the posterior tibial vein. Partial compression of the left peroneal vein. The visualized portions of the IVC are patent. There is extension of the   thrombus into the saphenofemoral junction. No popliteal cyst.      Impression    IMPRESSION:  1.  Extensive left lower extremity deep vein thrombosis extending from the external iliac through the posterior tibial and peroneal veins.       Jose De Jesus Foote MD  Text Page  (7am to 6pm)

## 2024-09-12 NOTE — PLAN OF CARE
Goal Outcome Evaluation:                      Date/Time:9/11 [id number]    Trauma/Ortho/Medical (Choose one) :Medical    Diagnosis:Acute deep vein thrombosis (DVT) of femoral vein of left lower extremity (H)  POD#:Pending  Mental Status:A&O x 4  Activity/dangle:Up ad rose  Diet:NPO  Pain:Denies  Brandon/Voiding:BR  Tele/Restraints/Iso:NA  02/LDA:RA,PIV infusing heparin @1600 units,anti xa lab draw at 1225 pm,dextrose 5% in lactated ringers infusion @125 ml/hr  D/C Date:TBD  Other Info:IR consult

## 2024-09-12 NOTE — PROCEDURES
Interventional Radiology Post-Procedure Note    Procedure: LLE venogram with thrombectomy.    Attending: Dann Randle MD    Findings: Occlused left CFV and FV. Excellent results post thrombectomy with inline patent venous system.    Plan: Bedrest until pursestring removal tomorrow AM.

## 2024-09-12 NOTE — ED PROVIDER NOTES
Emergency Department Note      History of Present Illness     Chief Complaint   Leg Pain (/)    HPI   Mark T Goblisch is a 58 year old male who presents to the ED for leg pain. The patient was recently seen at [facility], and he was transferred to this ED for IR treatment. Patient states that he had a hip replacement about three months ago. Last week, he began to experience increased pain and swelling in his left leg. The pain came on quickly. He notes that he flew to [place] and back over the weekend. Since Friday, he has noted a change in color to his left leg, as well as increased pressure. He denies chest pain or shortness of breath. No fever, diarrhea, vomiting, or abdominal pain. No history of hypertension or hyperlipidemia. He did note that he had a stroke about 5 years ago.      Independent Historian   None    Review of External Notes   Reviewed the emergency department note from earlier this evening with Dr. Szymanski.    Past Medical History   Medical History and Problem List   CVA  Gout  PFO     Medications   The patient denies current use of prescribed medication.      Surgical History   L total hip arthroplasty  Physical Exam     Patient Vitals for the past 24 hrs:   BP Temp Temp src Pulse Resp SpO2 Height Weight   09/11/24 2350 114/77 98.3  F (36.8  C) Oral 72 18 96 % -- --   09/11/24 2230 (!) 132/93 -- -- 78 -- 97 % -- --   09/11/24 2200 (!) 129/93 -- -- 66 -- 98 % -- --   09/11/24 2130 125/84 -- -- 66 -- 96 % -- --   09/11/24 2100 125/86 -- -- 67 -- 96 % -- --   09/11/24 2029 -- 97.6  F (36.4  C) Temporal -- 18 -- -- --   09/11/24 2026 -- -- -- -- -- 97 % -- --   09/11/24 2025 -- -- -- -- -- 92 % -- --   09/11/24 2024 (!) 121/90 -- -- 62 -- -- -- --   09/11/24 2023 -- -- -- -- -- -- 1.829 m (6') 95.5 kg (210 lb 8.6 oz)     Physical Exam  General: Alert, appears well-developed and well-nourished. Cooperative.     In mild distress  HEENT:  Head:  Atraumatic  Ears:  External ears are normal  Mouth/Throat:   Oropharynx is without erythema or exudate and mucous membranes are moist.   Eyes:   Conjunctivae normal and EOM are normal. No scleral icterus.  CV:  Normal rate, regular rhythm, normal heart sounds and radial pulses are 2+ and symmetric.  No murmur.  Resp:  Breath sounds are clear bilaterally    Non-labored, no retractions or accessory muscle use  GI:  Abdomen is soft, no distension, no tenderness. No rebound or guarding.  No CVA tenderness bilaterally  MS:  Normal range of motion.    Left Hip:    There is normal ROM of the hip    Flexion and Extension of the hip is normal    There is no evidence of clinical dislocation    There is no hematoma or obvious bursitis    The femur appears normal and without pain    There is no hematoma to the thigh    There is significant swelling to the entirety of the left lower extremity with discoloration of the leg compared with the RLE.  Doppler of dorsal pedis pulse to the left foot at bedside.      Sensory and Motor exam to the thigh and distal leg are normal  Skin:  Warm and dry.  Discoloration of the left lower extremity compared with RLE.     Neuro:   Alert. Normal strength.  Sensation intact in all 4 extremities. GCS: 15  Psych: Normal mood and affect.    Diagnostics   Lab Results   Labs Ordered and Resulted from Time of ED Arrival to Time of ED Departure - No data to display    Imaging   No orders to display     Independent Interpretation   None    ED Course    Medications Administered   Medications   heparin 25,000 units in 0.45% NaCl 250 mL ANTICOAGULANT infusion (1,700 Units/hr Intravenous $New Bag 9/12/24 0108)   lidocaine 1 % 0.1-1 mL (has no administration in time range)   lidocaine (LMX4) cream (has no administration in time range)   sodium chloride (PF) 0.9% PF flush 3 mL (3 mLs Intracatheter Not Given 9/12/24 0120)   sodium chloride (PF) 0.9% PF flush 3 mL (has no administration in time range)   senna-docusate (SENOKOT-S/PERICOLACE) 8.6-50 MG per tablet 1 tablet  (has no administration in time range)     Or   senna-docusate (SENOKOT-S/PERICOLACE) 8.6-50 MG per tablet 2 tablet (has no administration in time range)   calcium carbonate (TUMS) chewable tablet 1,000 mg (has no administration in time range)   dextrose 5% in lactated ringers infusion ( Intravenous $New Bag 9/12/24 3397)   Patient is already receiving anticoagulation with heparin, enoxaparin (LOVENOX), warfarin (COUMADIN)  or other anticoagulant medication (has no administration in time range)   hydrALAZINE (APRESOLINE) tablet 10 mg (has no administration in time range)     Or   hydrALAZINE (APRESOLINE) injection 10 mg (has no administration in time range)   acetaminophen (TYLENOL) tablet 650 mg (has no administration in time range)     Or   acetaminophen (TYLENOL) Suppository 650 mg (has no administration in time range)   oxyCODONE IR (ROXICODONE) half-tab 2.5 mg (has no administration in time range)   oxyCODONE (ROXICODONE) tablet 5 mg (has no administration in time range)   HYDROmorphone (DILAUDID) injection 0.2 mg (has no administration in time range)   HYDROmorphone (DILAUDID) injection 0.4 mg (has no administration in time range)   melatonin tablet 5 mg (has no administration in time range)   polyethylene glycol (MIRALAX) Packet 17 g (has no administration in time range)   bisacodyl (DULCOLAX) suppository 10 mg (has no administration in time range)   ondansetron (ZOFRAN ODT) ODT tab 4 mg (has no administration in time range)     Or   ondansetron (ZOFRAN) injection 4 mg (has no administration in time range)   prochlorperazine (COMPAZINE) injection 10 mg (has no administration in time range)     Or   prochlorperazine (COMPAZINE) tablet 10 mg (has no administration in time range)     Or   prochlorperazine (COMPAZINE) suppository 25 mg (has no administration in time range)   artificial saliva (BIOTENE MT) solution 2 spray (has no administration in time range)   naloxone (NARCAN) injection 0.2 mg (has no  administration in time range)     Or   naloxone (NARCAN) injection 0.4 mg (has no administration in time range)     Or   naloxone (NARCAN) injection 0.2 mg (has no administration in time range)     Or   naloxone (NARCAN) injection 0.4 mg (has no administration in time range)       Procedures   Procedures     Discussion of Management   Interventional radiology, as noted.  Admitting hospitalist, as noted.     ED Course   ED Course as of 09/12/24 0240   Wed Sep 11, 2024   2045 I obtained history and examined the patient as noted above.     2056 I spoke with Dr. Scott, IR, regarding the patient. They recommend admitting the patient to the hospital.     2118 I spoke with Dr. Cheatham, of the hospitalist team, regarding the patient. They accepted the patient for admission to the hospital.       Additional Documentation  None    Medical Decision Making / Diagnosis   CMS Diagnoses: None    MIPS       None    MDM   Mark T Goblisch is a 58 year old male who presents with DVT of the left lower extremity.  Patient had a left hip arthroplasty in late May.  He had increased swelling of the left lower extremity in the last 1 week.  Ultrasound from [facility] earlier this evening shows extensive DVT of the left lower extremity.  Patient has heparin infusion running upon arrival in our emergency department.  I rediscussed the case with interventional radiology who recommended n.p.o. at midnight for procedural intervention tomorrow morning.  Anticipate thrombectomy with potential lytics.  Per discussion with interventional radiology no indication for emergent intervention this evening.  Discussed case with hospitalist service who agreed to admission with ongoing IR consultation.  Spoke with Dr. Cheatham of the hospitalist service who agreed to admission.    Laboratory work was not obtained in our emergency department as it was just drawn at [facility] emergency department.  Please see Dr. Szymanski note for additional information from his  initial ED visit this evening.    Disposition   The patient was admitted to the hospital.     Diagnosis     ICD-10-CM    1. 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 History of left hip replacement  Z96.642       3. Acute deep vein thrombosis (DVT) of femoral vein of left lower extremity (H)  I82.412            Discharge Medications   Current Discharge Medication List        Scribe Disclosure:  Morris LR, am serving as a scribe at 8:46 PM on 9/11/2024 to document services personally performed by Moi Lockwood MD based on my observations and the provider's statements to me.        Moi Lockwood MD  09/12/24 0240

## 2024-09-12 NOTE — PRE-PROCEDURE
GENERAL PRE-PROCEDURE:   Procedure:  Left leg venogram with possible thrombectomy, venoplasty, stent with moderate sedation. Possible temporary inferior vena cava filter placement  Date/Time:  9/12/2024 9:39 AM    Written consent obtained?: Yes    Risks and benefits: Risks, benefits and alternatives were discussed    Consent given by:  Patient  Patient states understanding of procedure being performed: Yes    Patient's understanding of procedure matches consent: Yes    Procedure consent matches procedure scheduled: Yes    Expected level of sedation:  Moderate  Appropriately NPO:  Yes  ASA Class:  2  Mallampati  :  Grade 1- soft palate, uvula, tonsillar pillars, and posterior pharyngeal wall visible  Lungs:  Lungs clear with good breath sounds bilaterally  Heart:  Normal heart sounds and rate  History & Physical reviewed:  History and physical reviewed and no updates needed  Statement of review:  I have reviewed the lab findings, diagnostic data, medications, and the plan for sedation

## 2024-09-13 VITALS
HEIGHT: 72 IN | SYSTOLIC BLOOD PRESSURE: 123 MMHG | RESPIRATION RATE: 16 BRPM | OXYGEN SATURATION: 97 % | HEART RATE: 64 BPM | BODY MASS INDEX: 28.93 KG/M2 | TEMPERATURE: 98 F | DIASTOLIC BLOOD PRESSURE: 82 MMHG | WEIGHT: 213.63 LBS

## 2024-09-13 LAB
UFH PPP CHRO-ACNC: 0.16 IU/ML
UFH PPP CHRO-ACNC: 0.41 IU/ML

## 2024-09-13 PROCEDURE — 250N000013 HC RX MED GY IP 250 OP 250 PS 637

## 2024-09-13 PROCEDURE — 85520 HEPARIN ASSAY: CPT | Performed by: INTERNAL MEDICINE

## 2024-09-13 PROCEDURE — 99239 HOSP IP/OBS DSCHRG MGMT >30: CPT | Performed by: INTERNAL MEDICINE

## 2024-09-13 PROCEDURE — 36415 COLL VENOUS BLD VENIPUNCTURE: CPT | Performed by: INTERNAL MEDICINE

## 2024-09-13 PROCEDURE — 250N000011 HC RX IP 250 OP 636: Performed by: INTERNAL MEDICINE

## 2024-09-13 PROCEDURE — 8E0YXY8 SUTURE REMOVAL FROM LOWER EXTREMITY: ICD-10-PCS | Performed by: NURSE PRACTITIONER

## 2024-09-13 RX ADMIN — HEPARIN SODIUM 1800 UNITS/HR: 10000 INJECTION, SOLUTION INTRAVENOUS at 13:26

## 2024-09-13 RX ADMIN — RIVAROXABAN 15 MG: 10 TABLET, FILM COATED ORAL at 15:48

## 2024-09-13 RX ADMIN — SODIUM CHLORIDE, SODIUM LACTATE, POTASSIUM CHLORIDE, CALCIUM CHLORIDE AND DEXTROSE MONOHYDRATE: 5; 600; 310; 30; 20 INJECTION, SOLUTION INTRAVENOUS at 06:45

## 2024-09-13 ASSESSMENT — ACTIVITIES OF DAILY LIVING (ADL)
ADLS_ACUITY_SCORE: 20

## 2024-09-13 NOTE — PLAN OF CARE
Trauma/Ortho/Medical (Choose one)  Mad[facility].    Diagnosis: LLE DVT, LLE Venogram with Thrombectomy.  POD#: 0  Mental Status: A&OX4.  Activity/dangle   Diet: Regular Diet.  Pain: Denies pain.  Brandon/Voiding: Urinal bedrest.  Tele/Restraints/Iso: None.  02/LDA: PIV PIV Heparin infusing 1500 units/hr. Dextrose 5% in LR infusing at 125ml/hr.  D/C Date: Pending.  Other Info: VSS on room air. CMS intact  and LLE incisions DCI.

## 2024-09-13 NOTE — PROGRESS NOTES
Interventional Radiology Progress Note:  Inpatient at [facility]  Date: September 13, 2024     HPI:Mark T Goblisch is a 58 year old male admitted on 9/11/2024. 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     He is s/p LLE DVT thrombectomy 9/12 at 6 PM with the following,     Findings: Occlused left CFV and FV. Excellent results post thrombectomy with inline patent venous system.     Plan: Bedrest until pursestring removal tomorrow AM.    Interval History: Feeling better except slight headache which he wondered was from the heparin. Eating and voiding well. Left Leg is less swollen and feels better.     Physical Exam:   Vitals: Temp:  [98.2  F (36.8  C)-98.4  F (36.9  C)] 98.3  F (36.8  C)  Pulse:  [66-89] 72  Resp:  [5-51] 16  BP: (110-141)/(65-97) 110/65  SpO2:  [89 %-99 %] 96 %    General: Pleasant, comfortable appearing male in no acute distress.    Neuro:  A&O x 4. Moves all extremities equally.  Resp:  Normal respirations on room air. Non labored breathing. Equal air entry B/L   Vascular: Left posterior popliteal procedure site with dressing CDI. Site soft with minimal tenderness.  -Left leg thigh is much softer and less swollen than yesterday  -Left below the knee calf, ankle and foot swelling is still present and will take longer to resolve     Labs:  ROUTINE ICU LABS (Last four results)  CMP  Recent Labs   Lab 09/11/24  1716      POTASSIUM 3.9   CHLORIDE 103   CO2 25   ANIONGAP 10   GLC 90   BUN 16.1   CR 1.23*   GFRESTIMATED 69   LINDSEY 10.8*     CBC  Recent Labs   Lab 09/11/24  1716   WBC 7.3   RBC 4.50   HGB 14.6   HCT 44.5   MCV 99   MCH 32.4   MCHC 32.8   RDW 13.2        Assessment: Successful LLE DVT thrombectomy procedure with good symptomatic results. Patient can be up with stitch in place and he is  aware that he will feel poking so won't bend his left knee too much. I will remove the stitch in the afternoon.   -Discussed headache, probably not from heparin, he wasn't even thinking it could be a bleed which I did mention which I doubted. It more likely is due from having a procedure, bedrest, other medication. He will keep an eye on it and was reluctant to take Tylenol for it as he said it's better now.     Plan:   -Patient can be up now with suture in place. Will remove it later today  -From IR standpoint patient can discharge today if felt to be stable by hospitalist  -Oral anticoagulation plan per hospitalist or hematologist  -Irving has compression hose at home he will wear for now  -IR will see Irving in on month for US and clinic visit. [facility] will call Irving to set up appointment after discharge    Total time spent on the date of the encounter is 30 minutes, including time spent counseling the patient, performing a medically appropriate evaluation, reviewing prior medical history, ordering medications and tests, documenting clinical information in the medical record, and communication of results.    Thanks [facility] Interventional Radiology CNP ([phone]) (phone [phone])

## 2024-09-13 NOTE — DISCHARGE INSTRUCTIONS
Routine, Mechanical Thrombectomy Procedure:     This procedure is used to help when people have a bloodclot(s) blocking one or more of the large blood vessels within the body. It involves inserting a catheter (thin flexible tube) with a device into the blood stream so that clot within blood vessel can be removed. This treatment can reduce the long-term effects of clot burden. Please follow the below instructions as you recover:     Care instructions after angiogram procedure:     - Do not lift objects greater than 10 pounds for 2 days following the procedure.   - Avoid excessive exercise and straining for 2 days. Avoid tub baths, pools, hot tubs and Jacuzzis for 3 days or until procedure site is well healed.   - You may shower beginning tomorrow. Do not scrub procedure site until well healed; pat dry.   - Return to yournormal activities as you tolerate after the 2 day restriction.   - You can expect to return to work 1-2 days after your procedure depending on the nature of your profession.   - It is normal to have sometenderness and minimal swelling at procedure puncture site. A small area of discoloration may be present. Tenderness typically subsides in 1-2 days. A small knot may also be present at puncture site for 6-8 weeks. This can be a normal part of the healing process.     Please seek medical evaluation for:   - If you develop fevers (greater than 101 F (38.3C)).   - If you develop increasing pain, redness, purulent drainage,tenderness, or swelling at procedure site.   - If you experience any bleeding from procedure/puncture site: lie down, firmly apply pressure to puncture site and call 911. - Seek emergent evaluation if you experience any new leg/arm pain, discoloration or numbness.   - Increasing shortness of breath.   - Increasing swelling or pain within your arms/legs.     You will receive a call from the [facility] to help coordinate a clinic visit and US in one month with the IR provider  who did your procedure. If you had an IVC filter placed removal will be discussed at the time of your clinic visit.     Please call Lazara MONZON RN clinician at  or Latrice MONZON NP at  for questions or concerns about the procedure or post-procedure care. You can leave a voicemail. We work Monday through Friday [phone] or [phone]. An alternative number to call for questions is Interventional Radiology at

## 2024-09-13 NOTE — DISCHARGE SUMMARY
[facility]  Hospitalist Discharge Summary      Date of Admission:  9/11/2024  Date of Discharge:  9/13/2024  Discharging Provider: Jose De Jesus Foote MD  Discharge Service: Hospitalist Service    Discharge Diagnoses    Extensive left lower extremity DVT, provoked, with phlegmasia cerulea dolens:      History of left-sided embolic CVA 2019:       Clinically Significant Risk Factors Present on Admission           # Hypercalcemia: Highest Ca = 10.8 mg/dL in last 2 days, will monitor as appropriate      # Drug Induced Platelet Defect: home medication list includes an antiplatelet medication            # Overweight: Estimated body mass index is 28.55 kg/m  as calculated from the following:    Height as of this encounter: 1.829 m (6').    Weight as of this encounter: 95.5 kg (210 lb 8.6 oz).            Disposition Plan     Medically Ready for Discharge: Anticipated on 9/13     Disposition:   -- to home    Clinically Significant Risk Factors     # Overweight: Estimated body mass index is 28.97 kg/m  as calculated from the following:    Height as of this encounter: 1.829 m (6').    Weight as of this encounter: 96.9 kg (213 lb 10 oz).       Follow-ups Needed After Discharge   Follow-up Appointments     Follow-up and recommended labs and tests       Follow up with IR in 1 month as directed        {Additional follow-up instructions/to-do's for PCP and IR    Unresulted Labs Ordered in the Past 30 Days of this Admission       No orders found from 8/12/2024 to 9/12/2024.        These results will be followed up by PCP and IR    Discharge Disposition   Discharged to home  Condition at discharge: Stable    Hospital Course   Mark T Goblisch is a 58 year old male admitted on 9/11/2024. 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         Extensive left lower extremity DVT, provoked, with phlegmasia cerulea dolens: Following left hip arthroplasty late May.  Had swelling at his 6-week follow-up which likely was early DVT at that time.  Significant increase in swelling over the past 1 week.  History of hypercoagulable workup in 2019 following CVA was negative  -Heparin infusion, high intensity  -Interventional radiology consulted and patient underwent thrombectomy left CFV and superficial femoral vein  -Elevate left lower extremity as able  -Thigh-high compression stocking   -Pharmacy liaison consultation for DOAC coverage  -discharge with xarelto     History of left-sided embolic CVA 2019: Initially presented with right-sided paresis and expressive aphasia.  Recovered with no residual deficits  -stop asa while on DOAC    Diet:  regular diet  DVT Prophylaxis: Heparin infusion  Brandon Catheter: Not present  Lines: None     Cardiac Monitoring: None  Code Status:  Full code.  Confirmed with patient on admission        Clinically Significant Risk Factors Present on Admission           # Hypercalcemia: Highest Ca = 10.8 mg/dL in last 2 days, will monitor as appropriate      # Drug Induced Platelet Defect: home medication list includes an antiplatelet medication            # Overweight: Estimated body mass index is 28.55 kg/m  as calculated from the following:    Height as of this encounter: 1.829 m (6').    Weight as of this encounter: 95.5 kg (210 lb 8.6 oz).           Disposition Plan     Medically Ready for Discharge: Anticipated on 9/13    Disposition:   -- to home    Consultations This Hospital Stay   PHARMACY IP CONSULT  INTERVENTIONAL RADIOLOGY ADULT/PEDS IP CONSULT  PHARMACY IP CONSULT  PHARMACY LIAISON FOR MEDICATION COVERAGE CONSULT  PHARMACY IP CONSULT    Code Status   Full Code    Time Spent on this Encounter   I, Jose De Jesus Foote MD, personally saw the patient today and spent greater than 30 minutes discharging this patient.       Jose De Jesus Lopez  MD Qamar  [facility]  [address] [place] [address]  Phone: [phone]  Fax: [phone]  ______________________________________________________________________    Physical Exam   Vital Signs: Temp: 98.3  F (36.8  C) Temp src: Oral BP: 110/65 Pulse: 72   Resp: 16 SpO2: 96 % O2 Device: None (Room air) Oxygen Delivery: 2 LPM  Weight: 213 lbs 10.01 oz         Primary Care Physician   Physician No Ref-Primary    Discharge Orders      Reason for your hospital stay    Admit with acute on chronic DVT     Follow-up and recommended labs and tests     Follow up with IR in 1 month as directed     Activity    Your activity upon discharge: activity as tolerated, wear compression as directed     Diet    Follow this diet upon discharge: Current Diet:Orders Placed This Encounter      Regular Diet Adult       Significant Results and Procedures   Most Recent 3 CBC's:  Recent Labs   Lab Test 09/11/24  1716   WBC 7.3   HGB 14.6   MCV 99        Most Recent 3 BMP's:  Recent Labs   Lab Test 09/11/24  1716      POTASSIUM 3.9   CHLORIDE 103   CO2 25   BUN 16.1   CR 1.23*   ANIONGAP 10   LINDSEY 10.8*   GLC 90       Discharge Medications   Current Discharge Medication List        START taking these medications    Details   rivaroxaban ANTICOAGULANT (XARELTO) 2.5 MG TABS tablet Take 6 tablets (15 mg) by mouth 2 times daily (with meals) for 21 days, THEN 8 tablets (20 mg) daily (with dinner).  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

## 2024-09-13 NOTE — PLAN OF CARE
Goal Outcome Evaluation:    Trauma/Ortho/Medical (Choose one) Ortho     Diagnosis: DVT LLE- Leg venogram with mechanical thrombectomy  POD#: 1  Mental Status: A/O x4  Activity/dangle: bed rest  Diet: Regular  Pain: denied  Brandon/Voiding:urinal   Tele/Restraints/Iso: NA  02/LDA: Ra/ PIV infusing  D/C Date: TBD  Other Info: heparin infusing at 1500 unit(s)/hr- Anti- Xa redraw at 1125 9/13.

## 2024-09-13 NOTE — CARE PLAN
Patient vital signs are at baseline: Yes    Patient able to ambulate as they were prior to admission or with assist devices provided by therapies during their stay: Yes   Patient MUST void prior to discharge:Yes    Patient able to tolerate oral intake:Yes    Pain has adequate pain control using Oral analgesics:No Denies   Does patient have an identified : Yes   Has goal D/C date and time been discussed with patient:Discharged order placed , heparin discontinues, interventional radiology to remove stem from lower left leg before discharge.

## 2024-09-13 NOTE — PROGRESS NOTES
L posterior popliteal pursestring suture removed intact without pain or complications. Gauze and tape placed over the site. Discharge instructions entered and discussed.     Total time: 15 minutes    ThanksLatrice [facility] Interventional Radiology CNP ([phone]) (phone [phone])

## 2024-09-14 NOTE — PROGRESS NOTES
AVS reviewed with patient, questions answered, IV removed and belongings and filled Rx sent with patient. Patient discharged to home via ride from spouse.

## 2024-09-16 DIAGNOSIS — I82.422 ACUTE DEEP VEIN THROMBOSIS (DVT) OF ILIAC VEIN OF LEFT LOWER EXTREMITY (H): Primary | ICD-10-CM

## 2024-09-18 ENCOUNTER — TELEPHONE (OUTPATIENT)
Dept: OTHER | Facility: CLINIC | Age: 59
End: 2024-09-18
Payer: COMMERCIAL

## 2024-09-18 NOTE — TELEPHONE ENCOUNTER
RAKAN for patient to call and schedule imaging US Lower Extremity Venous Duplex Left and an appointment with Dr. Ross on 10/21/24.    Appt Note: s/p left lower extremity venous thrombectomy 9/12/2024 with IR. 1 mo f/u with IR

## 2024-10-07 ENCOUNTER — TELEPHONE (OUTPATIENT)
Dept: OTHER | Facility: CLINIC | Age: 59
End: 2024-10-07
Payer: COMMERCIAL

## 2024-10-07 DIAGNOSIS — I82.422 ACUTE DEEP VEIN THROMBOSIS (DVT) OF ILIAC VEIN OF LEFT LOWER EXTREMITY (H): Primary | ICD-10-CM

## 2024-10-07 NOTE — TELEPHONE ENCOUNTER
[facility]    Who is the name of the provider?:  MARY PIERCE   What is the location you see this provider at/preferred location?: [place]  Person calling / Facility: Irving MOROCHO [facility]  Phone number:  [phone] (home)   Nurse call back needed:  Yes     Reason for call:  Patient recently had a procedure, has a post op 10/21/2024. Does he need a refill of Xarelto to continue taking medication until post-op appt?    Pharmacy location:  Data Unavailable  Outside Imaging: n/a   Can we leave a detailed message on this number?  YES     10/7/2024, 8:52 AM

## 2024-10-07 NOTE — TELEPHONE ENCOUNTER
Contacted patient, he states he was not instructed to follow up with PCP and no hematology referral was made at discharge.  Hospitalist instructions were to follow up with IR.  The patient states he will run out of Xarelto in 2 days and need Rx.  He is to be on 20 mg daily  Contacted Dr. Ross.  Instructed to fill Rx for Xarelto 20 mg daily, for 90 days.  Placed Vascular medicine referral for ongoing management of AC.  Discussed with patient, verbalizes understanding.    Lazara Garza RN  IR nurse clinician  [phone]

## 2024-10-08 ENCOUNTER — DOCUMENTATION ONLY (OUTPATIENT)
Dept: ANTICOAGULATION | Facility: CLINIC | Age: 59
End: 2024-10-08
Payer: COMMERCIAL

## 2024-10-08 NOTE — PROGRESS NOTES
Anticoagulant Therapeutic Duplication    Duplicate orders identified: same medication but different dose, form, frequency or route    The duplicate anticoagulant order(s) has been discontinued    Active anticoagulant: rivaroxaban (Xarelto)    Plan made per [facility] anticoagulation protocol.    Nicole Mcleod RN  10/8/2024

## 2024-10-09 ENCOUNTER — LAB (OUTPATIENT)
Dept: LAB | Facility: CLINIC | Age: 59
End: 2024-10-09
Payer: COMMERCIAL

## 2024-10-09 DIAGNOSIS — I82.422 ACUTE DEEP VEIN THROMBOSIS (DVT) OF ILIAC VEIN OF LEFT LOWER EXTREMITY (H): ICD-10-CM

## 2024-10-09 LAB — D DIMER PPP FEU-MCNC: <0.27 UG/ML FEU (ref 0–0.5)

## 2024-10-09 PROCEDURE — 85379 FIBRIN DEGRADATION QUANT: CPT

## 2024-10-09 PROCEDURE — 36415 COLL VENOUS BLD VENIPUNCTURE: CPT

## 2024-10-21 ENCOUNTER — OFFICE VISIT (OUTPATIENT)
Dept: OTHER | Facility: CLINIC | Age: 59
End: 2024-10-21
Attending: RADIOLOGY
Payer: COMMERCIAL

## 2024-10-21 ENCOUNTER — HOSPITAL ENCOUNTER (OUTPATIENT)
Dept: ULTRASOUND IMAGING | Facility: CLINIC | Age: 59
Discharge: HOME | End: 2024-10-21
Attending: RADIOLOGY
Payer: COMMERCIAL

## 2024-10-21 VITALS — HEART RATE: 73 BPM | SYSTOLIC BLOOD PRESSURE: 130 MMHG | DIASTOLIC BLOOD PRESSURE: 76 MMHG

## 2024-10-21 DIAGNOSIS — I82.422 ACUTE DEEP VEIN THROMBOSIS (DVT) OF ILIAC VEIN OF LEFT LOWER EXTREMITY (H): Primary | ICD-10-CM

## 2024-10-21 DIAGNOSIS — I82.422 ACUTE DEEP VEIN THROMBOSIS (DVT) OF ILIAC VEIN OF LEFT LOWER EXTREMITY (H): ICD-10-CM

## 2024-10-21 PROCEDURE — G0463 HOSPITAL OUTPT CLINIC VISIT: HCPCS | Performed by: RADIOLOGY

## 2024-10-21 PROCEDURE — 93971 EXTREMITY STUDY: CPT | Mod: LT

## 2024-10-21 NOTE — PROGRESS NOTES
[facility]        Patient is here for a follow up.    Pt is currently taking no meds that would impact our treatment plan.    /76 (BP Location: Right arm, Patient Position: Sitting, Cuff Size: Adult Regular)   Pulse 73     The provider has been notified that the patient has no concerns.     Questions patient would like addressed today are: N/A.    Refills are needed: N/A    Has homecare services and agency name:  Anuja Andres MA

## 2024-10-22 NOTE — PROGRESS NOTES
VASCULAR OUTPATIENT CONSULT OR VISIT  PHYSICIAN:  Dr. Renetta Ross      LOCATION: [facility]    Mark T Goblisch   Medical Record #:  [id number]  YOB: 1965  Age:  58 year old     Date of Service: 10/21/2024    PRIMARY CARE PROVIDER: No Ref-Primary, Physician      HPI:  Mark T Goblisch is a 58 year old male who presented to [facility] emergency room on 9/11/2024 wit complaints of left leg swelling.  The patient was 3 months post op total hip arthroplasty.  The patient also has history of CVA, and PFO. A left lower extremity venous ultrasound was obtained, which showed extensive left lower extremity deep vein thrombosis extending from the external iliac through the posterior tibial and peroneal veins.  The patient was then transferred to [facility] where he underwent a successful mechanical thrombectomy of the left lower extremity DVT with my partner Dr. Dann Randle. Post angiography showed significant improvement in reduction of the DVT. He was then discharged to home on Xarelto.  It was noted at discharge the patient was not scheduled for follow up with Hematology or PCP. The patient contacted my nurse for medication management.  Due to no hematology follow up planned, an Vascular Medicine referral was placed.  The patient is scheduled to see Dr. Darrius Brandon.   Today, the patient denies pain in the left leg.  He has mild swelling in the calf, and foot.  He is wearing compression stockings.  He is tolerating the Xarelto without reports of unusual bleeding.       PHH:  No past medical history on file.     Past Surgical History:   Procedure Laterality Date    IR LOWER EXTREMITY VENOGRAM LEFT  9/12/2024       ALLERGIES:  Patient has no known allergies.    MEDS:    Current Outpatient Medications:     NONFORMULARY, Tart cherry extract 1200 mg capsule- 1 capsule daily, Disp: , Rfl:     rivaroxaban ANTICOAGULANT (XARELTO) 20 MG TABS tablet,  Take 1 tablet (20 mg) by mouth daily (with dinner)., Disp: 90 tablet, Rfl: 1    SOCIAL HABITS:    History   Smoking Status    Never   Smokeless Tobacco    Never     Social History    Substance and Sexual Activity      Alcohol use: Not on file      History   Drug Use Not on file       FAMILY HISTORY:  No family history on file.    REVIEW OF SYSTEMS:    A 12 point ROS was reviewed and except for what is listed in the HPI above, all others are negative    PE:  /76 (BP Location: Right arm, Patient Position: Sitting, Cuff Size: Adult Regular)   Pulse 73   Wt Readings from Last 1 Encounters:   09/13/24 213 lb 10 oz (96.9 kg)     There is no height or weight on file to calculate BMI.    EXAM:  GENERAL: This is a well-developed 58 year old male who appears his stated age  EYES: Grossly normal.  MOUTH: Buccal mucosa normal   MUSCULOSKELETAL: Grossly normal and both lower extremities are intact.  HEME/LYMPH: No lymphedema  NEUROLOGIC: Focally intact, Alert and oriented x 3.   PSYCH: appropriate affect  INTEGUMENT: No open lesions or ulcers  LOWER EXTREMITIES: bilateral calf circumference is equal.  Distal pulses are intact.  Dependent rubor        DIAGNOSTIC STUDIES:     Images:  US Lower Extremity Venous Duplex Left    Result Date: 10/21/2024  US LOWER EXTREMITY VENOUS DUPLEX LEFT  10/21/2024 12:43 PM CLINICAL HISTORY/INDICATION: s/p left lower extremity venous thrombectomy 9/12/2024 with IR.  1 mo f/u with IR; Acute deep vein thrombosis (DVT) of iliac vein of left lower extremity (H) COMPARISON: Ultrasound dated 9/11/24. IR venogram dated 9/12/2024. 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 There is occlusive thrombus in the midportion within one of the left femoral veins. The other femoral vein is patent and compressible.  Nonocclusive thrombus in the left popliteal vein. The posterior tibial and peroneal veins demonstrate normal compressibility. The contralateral right common femoral vein demonstrates compressibility, spectral waveform, color flow and augmentation.     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 There is occlusive thrombus in the midportion within one of the left femoral veins. The other femoral vein is patent. Nonocclusive thrombus in the left popliteal vein. The clot burden is significantly improved compared to prior ultrasound dated 9/11/2024 where there was extensive deep venous thrombosis extending from the left external iliac vein through the posterior tibial and peroneal veins. JAYMIE BATRES MD   SYSTEM ID:  [id number]        LABS:      Sodium   Date Value Ref Range Status   09/11/2024 138 135 - 145 mmol/L Final     Urea Nitrogen   Date Value Ref Range Status   09/11/2024 16.1 6.0 - 20.0 mg/dL Final     Hemoglobin   Date Value Ref Range Status   09/11/2024 14.6 13.3 - 17.7 g/dL Final     Platelet Count   Date Value Ref Range Status   09/11/2024 173 150 - 450 10e3/uL Final     Assessment/Plan:  This is a pleasant 58 year old male who underwent successful thrombectomy of the left lower extremity for extensive DVT.  The patient continues to do well with mild swelling in the left lower extremity.  He denies pain in the left leg.  We discussed the results of the left lower extremity venous ultrasound that was performed today.  A portion of the femoral vein is duplicated on the left.  There is occlusive thrombus in the short segment of the femoral vein that is duplicated.  The main femoral vein is patent.  Non occlusive thrombus in the left popliteal vein.  This is a significant improvement.  We discussed with the patient that is not uncommon to have some residual clot. Our hope is over time this will resolve as well.  However, it does not pose any risk.    Plan:  continue on AC and follow up with   [facility].  Call our office if questions or concerns.  This was a in person visit in which 40 minutes of  total time was spent (either in face-to-face or non-face-to-face time).    Dr. Renetta Ross DO, RPVI  Pager: [phone]  Interventional Radiology   [facility]ascular [facility]  [phone]

## 2024-10-28 ENCOUNTER — OFFICE VISIT (OUTPATIENT)
Dept: OTHER | Facility: CLINIC | Age: 59
End: 2024-10-28
Payer: COMMERCIAL

## 2024-10-28 VITALS
SYSTOLIC BLOOD PRESSURE: 131 MMHG | WEIGHT: 205 LBS | BODY MASS INDEX: 27.8 KG/M2 | HEART RATE: 66 BPM | OXYGEN SATURATION: 98 % | DIASTOLIC BLOOD PRESSURE: 73 MMHG

## 2024-10-28 DIAGNOSIS — I82.422 ACUTE DEEP VEIN THROMBOSIS (DVT) OF ILIAC VEIN OF LEFT LOWER EXTREMITY (H): ICD-10-CM

## 2024-10-28 PROCEDURE — G0463 HOSPITAL OUTPT CLINIC VISIT: HCPCS | Performed by: INTERNAL MEDICINE

## 2024-10-28 PROCEDURE — G2211 COMPLEX E/M VISIT ADD ON: HCPCS | Performed by: INTERNAL MEDICINE

## 2024-10-28 PROCEDURE — 99205 OFFICE O/P NEW HI 60 MIN: CPT | Performed by: INTERNAL MEDICINE

## 2024-10-28 NOTE — PROGRESS NOTES
INITIAL VASCULAR MEDICAL ASSESSMENT  REFERRAL SOURCE: Dr. Ross.  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 Provoked as above. No HC w/u indicated. Previously post stroke one was normal. Remain on AC until at least 03/2025. Check below at that time, RTC one week later to ascertain if safe to stop AC.   Plan: D dimer quantitative, US Aorta/Ivc/Iliac Duplex        Complete, US Lower Extremity Venous Duplex         Left, rivaroxaban ANTICOAGULANT (XARELTO) 20 MG        TABS tablet, Compression Sleeve/Stocking Order         for DME - ONLY FOR DME                The longitudinal care of plan for Irving was addressed during this visit. Due to added complexity of care, we will continue to support Irving and the subsequent management of this condition and with ongoing continuity of care for this condition.       62 minutes total medical care on today's date.               HPI: Mark T Goblisch is a 59 year old male with a h/o *Lt DORIS in 05/2024. He does not recall anything other than ASA for DVT prophylaxis postoperatively. I do not have his discharge summary available for source documentation of those details. Six weeks later he developed ipsilateral edema. He presented to the ED on 09/11 and was found to have Lt EIV to calf vein DVT. He was transferred to [facility]. Mechanical thrombectomy was undertaken and he was discharged on Xarelto. He wear knee high compression and notes that on Xarelto he feels less congested, with a normal feeling LLE.     Review Of Systems  Skin: negative  Eyes: negative  Ears/Nose/Throat: negative  Respiratory: No shortness of breath, dyspnea on exertion, cough, or  hemoptysis  Cardiovascular: negative  Gastrointestinal: negative  Genitourinary: negative  Musculoskeletal: negative  Neurologic: negative  Psychiatric: negative  Hematologic/Lymphatic/Immunologic: negative  Endocrine: negative      PAST MEDICAL HISTORY:                No past medical history on file.    PAST SURGICAL HISTORY:                  Past Surgical History:   Procedure Laterality Date    IR LOWER EXTREMITY VENOGRAM LEFT  9/12/2024       CURRENT MEDICATIONS:                  Current Outpatient Medications   Medication Sig Dispense Refill    NONFORMULARY Tart cherry extract 1200 mg capsule- 1 capsule daily      rivaroxaban ANTICOAGULANT (XARELTO) 20 MG TABS tablet Take 1 tablet (20 mg) by mouth daily (with dinner). 90 tablet 1       ALLERGIES:                No Known Allergies    SOCIAL HISTORY:                  Social History     Socioeconomic History    Marital status:      Spouse name: Not on file    Number of children: Not on file    Years of education: Not on file    Highest education level: Not on file   Occupational History    Not on file   Tobacco Use    Smoking status: Never    Smokeless tobacco: Never   Substance and Sexual Activity    Alcohol use: Not on file    Drug use: Not on file    Sexual activity: Not on file   Other Topics Concern    Not on file   Social History Narrative    Not on file     Social Drivers of Health     Financial Resource Strain: Low Risk  (9/11/2024)    Financial Resource Strain     Within the past 12 months, have you or your family members you live with been unable to get utilities (heat, electricity) when it was really needed?: No   Food Insecurity: Low Risk  (9/11/2024)    Food Insecurity     Within the past 12 months, did you worry that your food would run out before you got money to buy more?: No     Within the past 12 months, did the food you bought just not last and you didn t have money to get more?: No   Transportation Needs: Low Risk  (9/11/2024)     Transportation Needs     Within the past 12 months, has lack of transportation kept you from medical appointments, getting your medicines, non-medical meetings or appointments, work, or from getting things that you need?: No   Physical Activity: Not on file   Stress: Not on file   Social Connections: Unknown (8/23/2023)    Received from [facility]    Social Connections     Frequency of Communication with Friends and Family: Not on file   Interpersonal Safety: Low Risk  (9/11/2024)    Interpersonal Safety     Do you feel physically and emotionally safe where you currently live?: Yes     Within the past 12 months, have you been hit, slapped, kicked or otherwise physically hurt by someone?: No     Within the past 12 months, have you been humiliated or emotionally abused in other ways by your partner or ex-partner?: No   Housing Stability: Low Risk  (9/11/2024)    Housing Stability     Do you have housing? : Yes     Are you worried about losing your housing?: No       FAMILY HISTORY:                 No family history on file.      Physical exam Reveals:    O/P: WNL  HEENT: WNL  NECK: No JVD, thyromegaly, or lymphadenopathy  HEART: RRR, no murmurs, gallops, or rubs  LUNGS: CTA bilaterally without rales, wheezes, or rhonchi  GI: NABS, nondistended, nontender, soft  EXT:without cyanosis, clubbing, or edema  NEURO: nonfocal  : no flank tenderness       All relevant labs and imaging reviewed by myself on today's date.

## 2024-10-28 NOTE — PROGRESS NOTES
[facility]        Patient is here for a consult to discuss AC management, lower extremity DVT, mechanical thrombectomy.    Pt is currently taking Xarelto.    /73 (BP Location: Left arm, Patient Position: Sitting, Cuff Size: Adult Regular)   Pulse 66   Wt 205 lb (93 kg)   SpO2 98%   BMI 27.80 kg/m      The provider has been notified that the patient has no concerns.     Questions patient would like addressed today are: N/A.    Refills are needed: N/A    Has homecare services and agency name:  Anuja Andres MA

## 2024-12-15 ENCOUNTER — HEALTH MAINTENANCE LETTER (OUTPATIENT)
Age: 59
End: 2024-12-15

## 2025-03-19 ENCOUNTER — OFFICE VISIT (OUTPATIENT)
Dept: OTHER | Facility: CLINIC | Age: 60
End: 2025-03-19
Attending: INTERNAL MEDICINE
Payer: COMMERCIAL

## 2025-03-19 ENCOUNTER — HOSPITAL ENCOUNTER (OUTPATIENT)
Dept: ULTRASOUND IMAGING | Facility: CLINIC | Age: 60
Discharge: HOME | End: 2025-03-19
Attending: INTERNAL MEDICINE
Payer: COMMERCIAL

## 2025-03-19 VITALS
SYSTOLIC BLOOD PRESSURE: 122 MMHG | BODY MASS INDEX: 27.8 KG/M2 | OXYGEN SATURATION: 97 % | HEART RATE: 54 BPM | DIASTOLIC BLOOD PRESSURE: 82 MMHG | WEIGHT: 205 LBS

## 2025-03-19 DIAGNOSIS — I82.422 ACUTE DEEP VEIN THROMBOSIS (DVT) OF ILIAC VEIN OF LEFT LOWER EXTREMITY (H): ICD-10-CM

## 2025-03-19 DIAGNOSIS — I82.422 ACUTE DEEP VEIN THROMBOSIS (DVT) OF ILIAC VEIN OF LEFT LOWER EXTREMITY (H): Primary | ICD-10-CM

## 2025-03-19 PROCEDURE — 99213 OFFICE O/P EST LOW 20 MIN: CPT | Performed by: INTERNAL MEDICINE

## 2025-03-19 PROCEDURE — 93971 EXTREMITY STUDY: CPT | Mod: LT

## 2025-03-19 PROCEDURE — 93978 VASCULAR STUDY: CPT

## 2025-03-19 NOTE — PROGRESS NOTES
VASCULAR MEDICAL ASSESSMENT  REFERRAL SOURCE: Dr. Ross.  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         A/P:        (I82.422) Acute deep vein thrombosis (DVT) of iliac vein of left lower extremity (H)  Comment: This was his first lifetime VTE of post DORIS provoked anatomically extensive DVT requiring mechanical thrombectomy 09/12/2024. Provoked as above. Also has duplicated FV and PTVs contributing to the above. No HC w/u indicated. Previously post stroke one was normal. Has been on AC for six months. D dimer currently undetectable. No further resorption of thrombus, which is chronic appearing.  Plan: Statistically, lifetime risk of bleeding on AC exceeds lifetime antithrombotic benefit at this point. Pt is advised to stop AC, check d dimer in one month. If d dimer increases appreciably, then pursue additional imaging and consider reAC for an as yet undetermined time period.  D dimer quantitative in one month, RTC several days later. Continue to wear compression stockings.                    The longitudinal care of plan for Irving was addressed during this visit. Due to added complexity of care, we will continue to support Irving and the subsequent management of this condition and with ongoing continuity of care for this condition.         47 minutes total medical care on today's date.                     HPI: Mark T Goblisch is a 59 year old male with a h/o *Lt DORIS in 05/2024. He does not recall anything other than ASA for DVT prophylaxis postoperatively. I do not have his discharge summary available for source documentation of those details. Six weeks later he developed ipsilateral edema. He presented to the ED on 09/11 and was found to have Lt EIV to calf vein DVT. He was transferred to [facility]. Mechanical thrombectomy was undertaken and he was discharged on Xarelto. He wear knee high compression and notes that on Xarelto  he feels less congested, with a normal feeling LLE.          Review Of Systems  Skin: negative  Eyes: negative  Ears/Nose/Throat: negative  Respiratory: No shortness of breath, dyspnea on exertion, cough, or hemoptysis  Cardiovascular: negative  Gastrointestinal: negative  Genitourinary: negative  Musculoskeletal: negative  Neurologic: negative  Psychiatric: negative  Hematologic/Lymphatic/Immunologic: negative  Endocrine: negative        PAST MEDICAL HISTORY:                No past medical history on file.    PAST SURGICAL HISTORY:                  Past Surgical History:   Procedure Laterality Date    IR LOWER EXTREMITY VENOGRAM LEFT  9/12/2024       CURRENT MEDICATIONS:                  Current Outpatient Medications   Medication Sig Dispense Refill    NONFORMULARY Tart cherry extract 1200 mg capsule- 1 capsule daily      rivaroxaban ANTICOAGULANT (XARELTO) 20 MG TABS tablet Take 1 tablet (20 mg) by mouth daily (with dinner). 90 tablet 3       ALLERGIES:                No Known Allergies    SOCIAL HISTORY:                  Social History     Socioeconomic History    Marital status:      Spouse name: Not on file    Number of children: Not on file    Years of education: Not on file    Highest education level: Not on file   Occupational History    Not on file   Tobacco Use    Smoking status: Never    Smokeless tobacco: Never   Substance and Sexual Activity    Alcohol use: Not on file    Drug use: Not on file    Sexual activity: Not on file   Other Topics Concern    Not on file   Social History Narrative    Not on file     Social Drivers of Health     Financial Resource Strain: Low Risk  (9/11/2024)    Financial Resource Strain     Within the past 12 months, have you or your family members you live with been unable to get utilities (heat, electricity) when it was really needed?: No   Food Insecurity: Low Risk  (9/11/2024)    Food Insecurity     Within the past 12 months, did you worry that your food would run  out before you got money to buy more?: No     Within the past 12 months, did the food you bought just not last and you didn t have money to get more?: No   Transportation Needs: Low Risk  (9/11/2024)    Transportation Needs     Within the past 12 months, has lack of transportation kept you from medical appointments, getting your medicines, non-medical meetings or appointments, work, or from getting things that you need?: No   Physical Activity: Not on file   Stress: Not on file   Social Connections: Unknown (8/23/2023)    Received [facility]    Social Connections     Frequency of Communication with Friends and Family: Not on file   Interpersonal Safety: Low Risk  (9/11/2024)    Interpersonal Safety     Do you feel physically and emotionally safe where you currently live?: Yes     Within the past 12 months, have you been hit, slapped, kicked or otherwise physically hurt by someone?: No     Within the past 12 months, have you been humiliated or emotionally abused in other ways by your partner or ex-partner?: No   Housing Stability: Low Risk  (9/11/2024)    Housing Stability     Do you have housing? : Yes     Are you worried about losing your housing?: No       FAMILY HISTORY:                 No family history on file.          Physical exam Reveals:    O/P: WNL  HEENT: WNL  NECK: No JVD, thyromegaly, or lymphadenopathy  HEART: RRR, no murmurs, gallops, or rubs  LUNGS: CTA bilaterally without rales, wheezes, or rhonchi  GI: NABS, nondistended, nontender, soft  EXT:without cyanosis, clubbing, or edema  NEURO: nonfocal  : no flank tenderness           All relevant labs and imaging reviewed by myself on today's date.

## 2025-03-19 NOTE — PROGRESS NOTES
[facility]        Patient is here for a follow up.    Pt is currently taking Xarelto.    /82 (BP Location: Right arm, Patient Position: Sitting, Cuff Size: Adult Large)   Pulse 54   Wt 205 lb (93 kg)   SpO2 97%   BMI 27.80 kg/m      The provider has been notified that the patient has concerns of continued swelling in leg.     Questions patient would like addressed today are: N/A.    Refills are needed: No    Has homecare services and agency name:  Anuja Andrse MA

## 2025-04-21 ENCOUNTER — LAB (OUTPATIENT)
Dept: LAB | Facility: CLINIC | Age: 60
End: 2025-04-21
Payer: COMMERCIAL

## 2025-04-21 DIAGNOSIS — I82.422 ACUTE DEEP VEIN THROMBOSIS (DVT) OF ILIAC VEIN OF LEFT LOWER EXTREMITY (H): ICD-10-CM

## 2025-04-21 LAB — D DIMER PPP FEU-MCNC: 0.51 UG/ML FEU (ref 0–0.5)

## 2025-04-21 PROCEDURE — 85379 FIBRIN DEGRADATION QUANT: CPT

## 2025-04-21 PROCEDURE — 36415 COLL VENOUS BLD VENIPUNCTURE: CPT

## 2025-04-24 ENCOUNTER — OFFICE VISIT (OUTPATIENT)
Dept: OTHER | Facility: CLINIC | Age: 60
End: 2025-04-24
Attending: INTERNAL MEDICINE
Payer: COMMERCIAL

## 2025-04-24 VITALS
WEIGHT: 200 LBS | BODY MASS INDEX: 27.12 KG/M2 | OXYGEN SATURATION: 98 % | DIASTOLIC BLOOD PRESSURE: 82 MMHG | HEART RATE: 56 BPM | SYSTOLIC BLOOD PRESSURE: 125 MMHG

## 2025-04-24 DIAGNOSIS — I82.422 ACUTE DEEP VEIN THROMBOSIS (DVT) OF ILIAC VEIN OF LEFT LOWER EXTREMITY (H): ICD-10-CM

## 2025-04-24 PROCEDURE — 99213 OFFICE O/P EST LOW 20 MIN: CPT | Performed by: INTERNAL MEDICINE

## 2025-04-24 RX ORDER — ASPIRIN 81 MG/1
81 TABLET ORAL DAILY
COMMUNITY

## 2025-04-24 NOTE — PROGRESS NOTES
[facility]        Patient is here for a follow up.    Pt is currently taking Aspirin.    /82 (BP Location: Right arm, Patient Position: Sitting, Cuff Size: Adult Large)   Pulse 56   Wt 200 lb (90.7 kg)   SpO2 98%   BMI 27.12 kg/m      The provider has been notified that the patient has concerns of elevated d-dimer.     Questions patient would like addressed today are: N/A.    Refills are needed: N/A    Has homecare services and agency name:  Anuja Andres MA

## 2025-04-24 NOTE — PROGRESS NOTES
VASCULAR MEDICAL ASSESSMENT  REFERRAL SOURCE: Dr. Ross.  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           Legend:  (H) High  A/P:        (I82.422) Acute deep vein thrombosis (DVT) of iliac vein of left lower extremity (H)    Comment: This was his first lifetime VTE of post DORIS provoked anatomically extensive DVT requiring mechanical thrombectomy 09/12/2024. Provoked as above. Also has duplicated FV and PTVs contributing to the above. No HC w/u indicated. Previously post stroke one was normal. Has been on AC for six months. D dimer currently undetectable. No further resorption of thrombus, which is chronic appearing. Statistically, lifetime risk of bleeding on AC exceeds lifetime antithrombotic benefit at this point. Pt was advised to stop AC on 3/19/25, check d dimer after off of AC one month. His 4/21/25 d dimer blas to 0.51 which is normal for age. He has no recurrent thrombosis sxs.     Plan: Statistically, lifetime risk of bleeding on AC exceeds lifetime antithrombotic benefit at this point. Pt was advised to stop AC on 3/19/25, check d dimer after off of AC one month. His 4/21/25 d dimer blas to 0.51 which is normal for age but increased form the prior undetectable value. Pt is advised to remain off of AC, check d dimer again in one month. If d dimer increases appreciably, then pursue additional imaging and consider reAC for an as yet undetermined time period. RTC several days later. Continue to wear compression stockings. Go to ED for any leg swelling, pina, GAYTAN, SOB which is new.                    The longitudinal care of plan for Irving was addressed during this visit. Due to added complexity of care, we will continue to support Irving and the subsequent management of this condition and with ongoing continuity of care for this condition.                    32 minutes total medical care on today's date.                      HPI: Mark T Goblisch is a 59 year old male with a h/o *Lt DORIS in 05/2024. He does not recall anything other than ASA for DVT prophylaxis postoperatively. I do not have his discharge summary available for source documentation of those details. Six weeks later he developed ipsilateral edema. He presented to the ED on 09/11 and was found to have Lt EIV to calf vein DVT. He was transferred to [facility]. Mechanical thrombectomy was undertaken and he was discharged on Xarelto. He wear knee high compression and notes that on Xarelto he feels less congested, with a normal feeling LLE.         Review Of Systems  Skin: negative  Eyes: negative  Ears/Nose/Throat: negative  Respiratory: No shortness of breath, dyspnea on exertion, cough, or hemoptysis  Cardiovascular: negative  Gastrointestinal: negative  Genitourinary: negative  Musculoskeletal: negative  Neurologic: negative  Psychiatric: negative  Hematologic/Lymphatic/Immunologic: negative  Endocrine: negative        PAST MEDICAL HISTORY:                No past medical history on file.    PAST SURGICAL HISTORY:                  Past Surgical History:   Procedure Laterality Date    IR LOWER EXTREMITY VENOGRAM LEFT  9/12/2024       CURRENT MEDICATIONS:                  Current Outpatient Medications   Medication Sig Dispense Refill    aspirin 81 MG EC tablet Take 81 mg by mouth daily.      NONFORMULARY Tart cherry extract 1200 mg capsule- 1 capsule daily      rivaroxaban ANTICOAGULANT (XARELTO) 20 MG TABS tablet Take 1 tablet (20 mg) by mouth daily (with dinner). 90 tablet 3       ALLERGIES:                No Known Allergies    SOCIAL HISTORY:                  Social History     Socioeconomic History    Marital status:      Spouse name: Not on file    Number of children: Not on file    Years of education: Not on file    Highest education level: Not on file   Occupational History    Not on file   Tobacco Use    Smoking status: Never    Smokeless tobacco: Never    Substance and Sexual Activity    Alcohol use: Not on file    Drug use: Not on file    Sexual activity: Not on file   Other Topics Concern    Not on file   Social History Narrative    Not on file     Social Drivers of Health     Financial Resource Strain: Low Risk  (9/11/2024)    Financial Resource Strain     Within the past 12 months, have you or your family members you live with been unable to get utilities (heat, electricity) when it was really needed?: No   Food Insecurity: Low Risk  (9/11/2024)    Food Insecurity     Within the past 12 months, did you worry that your food would run out before you got money to buy more?: No     Within the past 12 months, did the food you bought just not last and you didn t have money to get more?: No   Transportation Needs: Low Risk  (9/11/2024)    Transportation Needs     Within the past 12 months, has lack of transportation kept you from medical appointments, getting your medicines, non-medical meetings or appointments, work, or from getting things that you need?: No   Physical Activity: Not on file   Stress: Not on file   Social Connections: Unknown (8/23/2023)    Received from [facility]    Social Connections     Frequency of Communication with Friends and Family: Not on file   Interpersonal Safety: Low Risk  (9/11/2024)    Interpersonal Safety     Do you feel physically and emotionally safe where you currently live?: Yes     Within the past 12 months, have you been hit, slapped, kicked or otherwise physically hurt by someone?: No     Within the past 12 months, have you been humiliated or emotionally abused in other ways by your partner or ex-partner?: No   Housing Stability: Low Risk  (9/11/2024)    Housing Stability     Do you have housing? : Yes     Are you worried about losing your housing?: No       FAMILY HISTORY:                 No family history on file.        Physical exam Reveals:    O/P: WNL  HEENT: WNL  NECK: No JVD,  thyromegaly, or lymphadenopathy  HEART: RRR, no murmurs, gallops, or rubs  LUNGS: CTA bilaterally without rales, wheezes, or rhonchi  GI: NABS, nondistended, nontender, soft  EXT:without cyanosis, clubbing, or edema  NEURO: nonfocal  : no flank tenderness

## 2025-04-25 ENCOUNTER — TELEPHONE (OUTPATIENT)
Dept: OTHER | Facility: CLINIC | Age: 60
End: 2025-04-25

## 2025-04-25 DIAGNOSIS — I82.422 ACUTE DEEP VEIN THROMBOSIS (DVT) OF ILIAC VEIN OF LEFT LOWER EXTREMITY (H): Primary | ICD-10-CM

## 2025-04-25 NOTE — TELEPHONE ENCOUNTER
Called patient to schedule, patient will call back when he is ready to schedule as he was notified by his insurance Dr Brandon is out of his network.    Flagging to follow up 5/9/25

## 2025-04-25 NOTE — TELEPHONE ENCOUNTER
Routing to scheduling to coordinate the following:    Non-fasting d dimer  In person follow up a few days after  Please schedule this in 1 month     Appt note: Follow up to 4/24/25    Gloria NGUYỄN RN    [facility]  Office: [phone]  Fax: [phone]

## 2025-06-29 ENCOUNTER — HEALTH MAINTENANCE LETTER (OUTPATIENT)
Age: 60
End: 2025-06-29

## (undated) RX ORDER — LIDOCAINE HYDROCHLORIDE 10 MG/ML
INJECTION, SOLUTION INFILTRATION; PERINEURAL
Status: DISPENSED
Start: 2024-09-12

## (undated) RX ORDER — HEPARIN SODIUM 1000 [USP'U]/ML
INJECTION, SOLUTION INTRAVENOUS; SUBCUTANEOUS
Status: DISPENSED
Start: 2024-09-12

## (undated) RX ORDER — HEPARIN SODIUM 200 [USP'U]/100ML
INJECTION, SOLUTION INTRAVENOUS
Status: DISPENSED
Start: 2024-09-12